# Patient Record
Sex: FEMALE | Race: WHITE | NOT HISPANIC OR LATINO | Employment: UNEMPLOYED | URBAN - NONMETROPOLITAN AREA
[De-identification: names, ages, dates, MRNs, and addresses within clinical notes are randomized per-mention and may not be internally consistent; named-entity substitution may affect disease eponyms.]

---

## 2018-02-14 ENCOUNTER — HOSPITAL ENCOUNTER (EMERGENCY)
Facility: HOSPITAL | Age: 38
Discharge: HOME | End: 2018-02-15
Attending: EMERGENCY MEDICINE | Admitting: EMERGENCY MEDICINE

## 2018-02-14 VITALS
WEIGHT: 200.1 LBS | HEART RATE: 74 BPM | RESPIRATION RATE: 18 BRPM | TEMPERATURE: 99.2 F | OXYGEN SATURATION: 100 % | SYSTOLIC BLOOD PRESSURE: 133 MMHG | BODY MASS INDEX: 35.45 KG/M2 | DIASTOLIC BLOOD PRESSURE: 71 MMHG

## 2018-02-14 DIAGNOSIS — K04.7 DENTAL INFECTION: ICD-10-CM

## 2018-02-14 DIAGNOSIS — H66.90 OTITIS MEDIA: Primary | ICD-10-CM

## 2018-02-15 PROCEDURE — 99282 EMERGENCY DEPT VISIT SF MDM: CPT

## 2018-02-15 RX ORDER — OFLOXACIN 3 MG/ML
10 SOLUTION/ DROPS OPHTHALMIC 4 TIMES DAILY
Qty: 28 ML | Refills: 0 | Status: SHIPPED | OUTPATIENT
Start: 2018-02-15 | End: 2018-03-01

## 2018-02-15 RX ORDER — CLINDAMYCIN HYDROCHLORIDE 150 MG/1
450 CAPSULE ORAL EVERY 8 HOURS SCHEDULED
Qty: 63 CAPSULE | Refills: 0 | Status: SHIPPED | OUTPATIENT
Start: 2018-02-15 | End: 2018-02-22

## 2018-02-15 RX ORDER — LIDOCAINE HYDROCHLORIDE 10 MG/ML
1 INJECTION, SOLUTION EPIDURAL; INFILTRATION; INTRACAUDAL; PERINEURAL ONCE
Status: COMPLETED | OUTPATIENT
Start: 2018-02-15 | End: 2018-02-15

## 2018-02-15 RX ORDER — OFLOXACIN 3 MG/ML
10 SOLUTION/ DROPS OPHTHALMIC ONCE
Status: COMPLETED | OUTPATIENT
Start: 2018-02-15 | End: 2018-02-15

## 2018-02-15 RX ADMIN — OFLOXACIN 10 DROP: 3 SOLUTION/ DROPS OPHTHALMIC at 01:15

## 2018-02-15 RX ADMIN — LIDOCAINE HYDROCHLORIDE 1 ML: 10 INJECTION, SOLUTION EPIDURAL; INFILTRATION; INTRACAUDAL; PERINEURAL at 01:20

## 2018-02-15 NOTE — DISCHARGE INSTRUCTIONS
Dental Abscess   WHAT YOU NEED TO KNOW:   A dental abscess is a collection of pus in or around a tooth  A dental abscess is caused by bacteria  The bacteria usually enter the tooth when the enamel (outer part of the tooth) is damaged by tooth decay  Bacteria may also enter the tooth through a break or chip in the tooth, or a cut in the gum  Food particles that are stuck between the teeth for a long time may also lead to an abscess  DISCHARGE INSTRUCTIONS:   Return to the emergency department if:   · You have severe pain  · You have trouble breathing because of pain or swelling  Contact your healthcare provider if:   · Your symptoms get worse, even after treatment  · Your mouth is bleeding  · You cannot eat or drink because of pain or swelling  · Your abscess returns  · You have an injury that causes a crack in your tooth  · You have questions or concerns about your condition or care  Medicines: You may  need any of the following:  · Antibiotics  help treat a bacterial infection  · NSAIDs , such as ibuprofen, help decrease swelling, pain, and fever  This medicine is available with or without a doctor's order  NSAIDs can cause stomach bleeding or kidney problems in certain people  If you take blood thinner medicine, always ask your healthcare provider if NSAIDs are safe for you  Always read the medicine label and follow directions  · Acetaminophen  decreases pain and fever  It is available without a doctor's order  Ask how much to take and how often to take it  Follow directions  Read the labels of all other medicines you are using to see if they also contain acetaminophen, or ask your doctor or pharmacist  Acetaminophen can cause liver damage if not taken correctly  Do not use more than 4 grams (4,000 milligrams) total of acetaminophen in one day  · Prescription pain medicine  may be given  Ask your healthcare provider how to take this medicine safely   Some prescription pain medicines contain acetaminophen  Do not take other medicines that contain acetaminophen without talking to your healthcare provider  Too much acetaminophen may cause liver damage  Prescription pain medicine may cause constipation  Ask your healthcare provider how to prevent or treat constipation  · Take your medicine as directed  Contact your healthcare provider if you think your medicine is not helping or if you have side effects  Tell him of her if you are allergic to any medicine  Keep a list of the medicines, vitamins, and herbs you take  Include the amounts, and when and why you take them  Bring the list or the pill bottles to follow-up visits  Carry your medicine list with you in case of an emergency  Self-care:   · Rinse your mouth every 2 hours with salt water  This will help keep the area clean  · Gently brush your teeth twice a day with a soft tooth brush  This will help keep the area clean  · Eat soft foods as directed  Soft foods may cause less pain  Examples include applesauce, yogurt, and cooked pasta  Ask your healthcare provider how long to follow this instruction  · Apply a warm compress to your tooth or gum  Use a cotton ball or gauze soaked in warm water  Remove the compress in 10 minutes or when it becomes cool  Repeat 3 times a day  Prevent another abscess:   · Brush your teeth at least 2 times a day with fluoride toothpaste  · Use dental floss to clean between your teeth at least once a day  · Rinse your mouth with water or mouthwash after meals and snacks  · Chew sugarless gum after meals and snacks  · Limit foods that are sticky and high in sugar such as raisons  Also limit drinks high in sugar, such as soda  · See your dentist every 6 months for dental cleanings and oral exams  Follow up with your healthcare provider in 24 hours: Your healthcare provider will need to check your teeth and gums   Write down your questions so you remember to ask them during your visits  © 2017 [employer] Information is for End User's use only and may not be sold, redistributed or otherwise used for commercial purposes  All illustrations and images included in CareNotes® are the copyrighted property of A [employer]  or Raheel Mijares  The above information is an  only  It is not intended as medical advice for individual conditions or treatments  Talk to your doctor, nurse or pharmacist before following any medical regimen to see if it is safe and effective for you  Otitis Media   WHAT YOU NEED TO KNOW:   Otitis media is an ear infection  DISCHARGE INSTRUCTIONS:   Medicines:  · Ibuprofen or acetaminophen  helps decrease your pain and fever  They are available without a doctor's order  Ask your healthcare provider which medicine is right for you  Ask how much to take and how often to take it  These medicines can cause stomach bleeding if not taken correctly  Ibuprofen can cause kidney damage  Do not take ibuprofen if you have kidney disease, an ulcer, or allergies to aspirin  Acetaminophen can cause liver damage  Do not drink alcohol if you take acetaminophen  · Ear drops  help treat your ear pain  · Antibiotics  help treat a bacterial infection that caused your ear infection  · Take your medicine as directed  Contact your healthcare provider if you think your medicine is not helping or if you have side effects  Tell him or her if you are allergic to any medicine  Keep a list of the medicines, vitamins, and herbs you take  Include the amounts, and when and why you take them  Bring the list or the pill bottles to follow-up visits  Carry your medicine list with you in case of an emergency  Heat or ice:   · Heat  may be used to decrease your pain  Place a warm, moist washcloth on your ear  Apply for 15 to 20 minutes, 3 to 4 times a day    · Ice  helps decrease swelling and pain   Use an ice pack or put crushed ice in a plastic bag  Cover the ice pack with a towel and place it on your ear for 15 to 20 minutes, 3 to 4 times a day for 2 days  Prevent otitis media:   · Wash your hands often  Use soap and water  Wash your hands after you use the bathroom, change a child's diapers, or sneeze  Wash your hands before you prepare or eat food  · Stay away from people who are ill  Some germs are easily and quickly spread through contact  Return to work or school: You may return to work or school when your fever is gone  Follow up with your healthcare provider as directed:  Write down your questions so you remember to ask them during your visits  Contact your healthcare provider if:   · Your ear pain gets worse or does not go away, even after treatment  · The outside of your ear is red or swollen  · You have vomiting or diarrhea  · You have fluid coming from your ear  · You have questions or concerns about your condition or care  Return to the emergency department if:   · You have a seizure  · You have a fever and a stiff neck  © 2017 [address] [facility] Information is for End User's use only and may not be sold, redistributed or otherwise used for commercial purposes  All illustrations and images included in CareNotes® are the copyrighted property of [employer]  or Raheel Mijares  The above information is an  only  It is not intended as medical advice for individual conditions or treatments  Talk to your doctor, nurse or pharmacist before following any medical regimen to see if it is safe and effective for you

## 2018-02-17 NOTE — ED PROVIDER NOTES
History  Chief Complaint   Patient presents with    Earache     earache rt side x 2 days     Patient: Addi Escobar  37 y o /female  YOB: 1980  MRN: [id number]  PCP: No primary care provider on file  Date of evaluation: 2/14/2018    (N B  [address] may have been used in the preparation of this document )    Right-sided earache with associated dental pain        History provided by:  Patient  Earache   Location:  Right  Behind ear:  No abnormality  Severity:  Moderate  Onset quality:  Gradual  Duration:  2 days  Timing:  Constant  Progression:  Worsening  Chronicity:  New  Relieved by:  Nothing  Worsened by:  Nothing  Associated symptoms: no abdominal pain, no cough, no diarrhea, no fever, no rash and no vomiting        Prior to Admission Medications   Prescriptions Last Dose Informant Patient Reported? Taking?   ondansetron (ZOFRAN) 4 mg tablet   No No   Sig: Take 1 tablet by mouth every 8 (eight) hours as needed for nausea  Facility-Administered Medications: None       Past Medical History:   Diagnosis Date    Asthma        Past Surgical History:   Procedure Laterality Date    EXPLORATORY LAPAROTOMY      FRACTURE SURGERY      TONSILLECTOMY AND ADENOIDECTOMY         History reviewed  No pertinent family history  I have reviewed and agree with the history as documented  Social History   Substance Use Topics    Smoking status: Current Every Day Smoker     Packs/day: 1 00    Smokeless tobacco: Not on file    Alcohol use No        Review of Systems   Constitutional: Negative for chills and fever  HENT: Positive for ear pain (Right-sided for 2 days)  Respiratory: Negative for cough and shortness of breath  Cardiovascular: Negative for chest pain and palpitations  Gastrointestinal: Negative for abdominal pain, diarrhea and vomiting  Genitourinary: Negative for decreased urine volume and difficulty urinating  Skin: Negative for color change and rash  Physical Exam  ED Triage Vitals [02/14/18 2340]   Temperature Pulse Respirations Blood Pressure SpO2   99 2 °F (37 3 °C) 74 18 133/71 100 %      Temp Source Heart Rate Source Patient Position - Orthostatic VS BP Location FiO2 (%)   Temporal Monitor Sitting Left arm --      Pain Score       Worst Possible Pain           Orthostatic Vital Signs  Vitals:    02/14/18 2340   BP: 133/71   Pulse: 74   Patient Position - Orthostatic VS: Sitting       Physical Exam   Constitutional: She appears well-developed and well-nourished  HENT:   Right Ear: External ear and ear canal normal  Tympanic membrane is erythematous and bulging  Left Ear: Tympanic membrane, external ear and ear canal normal    Cardiovascular: Normal rate and regular rhythm  Pulmonary/Chest: Effort normal and breath sounds normal    Abdominal: Soft  There is no tenderness  Neurological: She is alert  GCS eye subscore is 4  GCS verbal subscore is 5  GCS motor subscore is 6  Psychiatric: She has a normal mood and affect  Her speech is normal and behavior is normal    Nursing note and vitals reviewed        ED Medications  Medications   lidocaine (PF) (XYLOCAINE-MPF) 1 % injection 1 mL (1 mL Intra-articular Given 2/15/18 0120)   ofloxacin (OCUFLOX) 0 3 % ophthalmic solution 10 drop (10 drops Otic Given 2/15/18 0115)       Diagnostic Studies  Results Reviewed     None                 No orders to display              Procedures  Procedures       Phone Contacts  ED Phone Contact    ED Course  ED Course                                MDM  CritCare Time    Disposition  Final diagnoses:   Otitis media   Dental infection     Time reflects when diagnosis was documented in both MDM as applicable and the Disposition within this note     Time User Action Codes Description Comment    2/15/2018  1:10 AM Sobeida Cruz Add [H66 90] Otitis media     2/15/2018  1:10 AM Jaime CONTRERAS Add [K04 7] Dental infection       ED Disposition     ED Disposition Condition Comment    Discharge  Nadeen Sanchez discharge to home/self care  Condition at discharge: Good        Follow-up Information     Follow up With Specialties Details [address]  Call Ask for a primary care provider (family doctor)  , (Also given paper with list of local doctors  ) [address]  Call in 1 day  [address] Brii Boyd MD Otolaryngology, Plastic Surgery Call in 1 day  [address] [facility] [address]  [phone]          Discharge Medication List as of 2/15/2018  1:14 AM      CONTINUE these medications which have NOT CHANGED    Details   ondansetron (ZOFRAN) 4 mg tablet Take 1 tablet by mouth every 8 (eight) hours as needed for nausea , Starting 9/11/2016, Until Discontinued, Print           No discharge procedures on file      ED Provider  Electronically Signed by           Katie Cooper MD  02/17/18 4806

## 2019-02-28 ENCOUNTER — HOSPITAL ENCOUNTER (EMERGENCY)
Facility: HOSPITAL | Age: 39
Discharge: HOME | End: 2019-02-28
Attending: EMERGENCY MEDICINE | Admitting: EMERGENCY MEDICINE

## 2019-02-28 VITALS
WEIGHT: 202.5 LBS | HEART RATE: 100 BPM | RESPIRATION RATE: 18 BRPM | BODY MASS INDEX: 35.87 KG/M2 | TEMPERATURE: 98.2 F | DIASTOLIC BLOOD PRESSURE: 76 MMHG | SYSTOLIC BLOOD PRESSURE: 130 MMHG | OXYGEN SATURATION: 99 %

## 2019-02-28 DIAGNOSIS — L98.9 LESION OF SKIN OF SCALP: ICD-10-CM

## 2019-02-28 DIAGNOSIS — L30.9 DERMATITIS: Primary | ICD-10-CM

## 2019-02-28 DIAGNOSIS — L29.9 SCALP ITCH: ICD-10-CM

## 2019-02-28 LAB — EXT PREG TEST URINE: NORMAL

## 2019-02-28 PROCEDURE — 81025 URINE PREGNANCY TEST: CPT | Performed by: EMERGENCY MEDICINE

## 2019-02-28 PROCEDURE — 99283 EMERGENCY DEPT VISIT LOW MDM: CPT

## 2019-02-28 RX ORDER — SULFAMETHOXAZOLE AND TRIMETHOPRIM 800; 160 MG/1; MG/1
1 TABLET ORAL 2 TIMES DAILY
Qty: 10 TABLET | Refills: 0 | Status: SHIPPED | OUTPATIENT
Start: 2019-02-28 | End: 2019-03-05

## 2019-02-28 RX ORDER — PREDNISONE 20 MG/1
60 TABLET ORAL ONCE
Status: COMPLETED | OUTPATIENT
Start: 2019-02-28 | End: 2019-02-28

## 2019-02-28 RX ORDER — CETIRIZINE HYDROCHLORIDE 10 MG/1
10 TABLET ORAL DAILY
Qty: 7 TABLET | Refills: 0 | Status: SHIPPED | OUTPATIENT
Start: 2019-02-28 | End: 2019-07-09

## 2019-02-28 RX ORDER — SULFAMETHOXAZOLE AND TRIMETHOPRIM 800; 160 MG/1; MG/1
1 TABLET ORAL ONCE
Status: COMPLETED | OUTPATIENT
Start: 2019-02-28 | End: 2019-02-28

## 2019-02-28 RX ORDER — LORATADINE 10 MG/1
10 TABLET ORAL ONCE
Status: COMPLETED | OUTPATIENT
Start: 2019-02-28 | End: 2019-02-28

## 2019-02-28 RX ADMIN — LORATADINE 10 MG: 10 TABLET ORAL at 03:44

## 2019-02-28 RX ADMIN — PREDNISONE 60 MG: 20 TABLET ORAL at 03:45

## 2019-02-28 RX ADMIN — SULFAMETHOXAZOLE AND TRIMETHOPRIM 1 TABLET: 800; 160 TABLET ORAL at 03:44

## 2019-02-28 NOTE — DISCHARGE INSTRUCTIONS
[address] [place], [place]  [address]    [address]   [address]   [address] 2000, [address] [facility]  [phone]    [facility]  [address] [place], [address] [facility] - [address] [place] [address], [address] [place] [address]  [phone]      [facility] [place]  [address]  [place], [address] [facility]  [phone]

## 2019-03-05 NOTE — ED PROVIDER NOTES
History  Chief Complaint   Patient presents with    Skin Irritation     Patient stated she has psoriasis and was scratching scalp and has the skin irritated and sore  Patient: Angel Given  38 y o /female  YOB: 1980  MRN: [id number]  PCP: No primary care provider on file  Date of evaluation: 2/28/2019    (NAYA Vale may have been used in the preparation of this document  Occasional wrong word or "sound-alike" substitutions may have occurred due to the inherent limitations of voice recognition software  Interpretation should be guided by context )    Pt c/o bumps and pain on scalp  She says that everything started with just scalp itching  As she scratched, she started to have pain, and then she noted scattered pimples  History limited by: Pt extremely inattentive and vague  Rash   Quality: itchiness and painful    Pain details:     Severity:  Unable to specify    Onset quality:  Unable to specify    Timing:  Unable to specify    Progression:  Worsening  Severity:  Unable to specify  Onset quality:  Unable to specify  Timing:  Constant  Progression:  Worsening  Chronicity:  New  Context: not insect bite/sting, not medications and not sick contacts  New detergent/soap: unclear  Relieved by:  Nothing  Worsened by:  Nothing  Ineffective treatments:  None tried  Associated symptoms: no abdominal pain, no diarrhea, no fever, no nausea, no shortness of breath, no sore throat, no throat swelling, no tongue swelling, no URI and not vomiting        None       Past Medical History:   Diagnosis Date    Asthma        Past Surgical History:   Procedure Laterality Date    EXPLORATORY LAPAROTOMY      FRACTURE SURGERY      TONSILLECTOMY AND ADENOIDECTOMY         History reviewed  No pertinent family history  I have reviewed and agree with the history as documented      Social History     Tobacco Use    Smoking status: Current Every Day Smoker     Packs/day: 1 00 Types: Cigarettes    Smokeless tobacco: Never Used   Substance Use Topics    Alcohol use: No    Drug use: Yes     Types: Marijuana     Comment: marjuana/meth use a "few months ago" and "clean of heroin 9 months"        Review of Systems   Constitutional: Negative for chills and fever  HENT: Negative for hearing loss, sore throat, trouble swallowing and voice change  Eyes: Negative for pain, redness and visual disturbance  Respiratory: Negative for cough and shortness of breath  Cardiovascular: Negative for chest pain and palpitations  Gastrointestinal: Negative for abdominal pain, constipation, diarrhea, nausea and vomiting  Genitourinary: Negative for dysuria, hematuria, vaginal bleeding and vaginal discharge  Musculoskeletal: Negative for back pain, gait problem and neck pain  Skin: Positive for rash (scalp)  Negative for color change  Neurological: Negative for weakness and light-headedness  Psychiatric/Behavioral: Negative for confusion and decreased concentration  The patient is not nervous/anxious  All other systems reviewed and are negative  Physical Exam  Physical Exam   Constitutional: She is oriented to person, place, and time  She appears well-developed and well-nourished  HENT:   Mouth/Throat: Oropharynx is clear and moist and mucous membranes are normal    Voice normal   Eyes: Pupils are equal, round, and reactive to light  EOM are normal    Cardiovascular: Normal rate and regular rhythm  Pulmonary/Chest: Effort normal    Abdominal: Soft  Bowel sounds are normal    Neurological: She is alert and oriented to person, place, and time  GCS eye subscore is 4  GCS verbal subscore is 5  GCS motor subscore is 6  Skin: Skin is warm and dry  Scattered whiteheads   Psychiatric: She has a normal mood and affect  Her speech is normal and behavior is normal    Nursing note and vitals reviewed        Vital Signs  ED Triage Vitals [02/28/19 0228]   Temperature Pulse Respirations Blood Pressure SpO2   98 6 °F (37 °C) 104 16 138/77 98 %      Temp Source Heart Rate Source Patient Position - Orthostatic VS BP Location FiO2 (%)   Temporal Monitor Sitting Right arm --      Pain Score       7           Vitals:    02/28/19 0228 02/28/19 0346   BP: 138/77 130/76   Pulse: 104 100   Patient Position - Orthostatic VS: Sitting        Visual Acuity      ED Medications  Medications   sulfamethoxazole-trimethoprim (BACTRIM DS) 800-160 mg per tablet 1 tablet (1 tablet Oral Given 2/28/19 0344)   predniSONE tablet 60 mg (60 mg Oral Given 2/28/19 0345)   loratadine (CLARITIN) tablet 10 mg (10 mg Oral Given 2/28/19 0344)       Diagnostic Studies  Results Reviewed     Procedure Component Value Units Date/Time    POCT pregnancy, urine [[id number]]  (Normal) Resulted:  02/28/19 0304    Lab Status:  Final result Updated:  02/28/19 0304     EXT PREG TEST UR (Ref: Negative) Negative Result                 No orders to display              Procedures  Procedures       Phone Contacts  ED Phone Contact    ED Course                               MDM    Disposition  Final diagnoses:   Dermatitis   Scalp itch   Lesion of skin of scalp     Time reflects when diagnosis was documented in both MDM as applicable and the Disposition within this note     Time User Action Codes Description Comment    2/28/2019  2:43 AM Kevin CONTRERAS Add [L30 9] Dermatitis     2/28/2019  2:43 AM Xochitl Cruz Add [L29 9] Scalp itch     2/28/2019  2:44 AM Kevin CONTRERAS Add [L98 9] Lesion of skin of scalp       ED Disposition     ED Disposition Condition Date/Time Comment    Discharge Stable u Feb 28, 2019  2:43 AM Bear Junk discharge to home/self care  Follow-up Information     Follow up With Specialties Details Why Contact Info    [employer]  Call  For followup, Ask for a primary care provider (family doctor)  , (Also given list of local PCPs) [phone]            Discharge Medication List as of 2/28/2019  3:26 AM CONTINUE these medications which have NOT CHANGED    Details   ondansetron (ZOFRAN) 4 mg tablet Take 1 tablet by mouth every 8 (eight) hours as needed for nausea , Starting 9/11/2016, Until Discontinued, Print           No discharge procedures on file      ED Provider  Electronically Signed by           Kiana Pederson MD  03/05/19 7978

## 2019-07-09 ENCOUNTER — HOSPITAL ENCOUNTER (EMERGENCY)
Facility: HOSPITAL | Age: 39
Discharge: HOME | End: 2019-07-09

## 2019-07-09 VITALS
SYSTOLIC BLOOD PRESSURE: 121 MMHG | WEIGHT: 150 LBS | DIASTOLIC BLOOD PRESSURE: 71 MMHG | OXYGEN SATURATION: 98 % | TEMPERATURE: 97.7 F | BODY MASS INDEX: 26.58 KG/M2 | HEIGHT: 63 IN | HEART RATE: 80 BPM | RESPIRATION RATE: 16 BRPM

## 2019-07-09 DIAGNOSIS — S61.219A FINGER LACERATION: ICD-10-CM

## 2019-07-09 DIAGNOSIS — S61.452A DOG BITE, HAND, LEFT, INITIAL ENCOUNTER: Primary | ICD-10-CM

## 2019-07-09 DIAGNOSIS — S60.311A ABRASION OF RIGHT THUMB, INITIAL ENCOUNTER: ICD-10-CM
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
## 2019-07-09 PROCEDURE — 99283 EMERGENCY DEPT VISIT LOW MDM: CPT

## 2019-07-09 RX ORDER — IBUPROFEN 800 MG/1
800 TABLET ORAL 3 TIMES DAILY
Qty: 12 TABLET | Refills: 0 | Status: SHIPPED | OUTPATIENT
Start: 2019-07-09 | End: 2019-07-13

## 2019-07-09 RX ORDER — LIDOCAINE HYDROCHLORIDE 20 MG/ML
10 INJECTION, SOLUTION EPIDURAL; INFILTRATION; INTRACAUDAL; PERINEURAL ONCE
Status: COMPLETED | OUTPATIENT
Start: 2019-07-09 | End: 2019-07-09

## 2019-07-09 RX ORDER — DOXYCYCLINE HYCLATE 100 MG/1
100 CAPSULE ORAL 2 TIMES DAILY
Qty: 14 CAPSULE | Refills: 0 | Status: SHIPPED | OUTPATIENT
Start: 2019-07-09 | End: 2019-07-16

## 2019-07-09 RX ORDER — GINSENG 100 MG
2 CAPSULE ORAL ONCE
Status: COMPLETED | OUTPATIENT
Start: 2019-07-09 | End: 2019-07-09

## 2019-07-09 RX ADMIN — BACITRACIN ZINC 2 SMALL APPLICATION: 500 OINTMENT TOPICAL at 14:35

## 2019-07-09 RX ADMIN — LIDOCAINE HYDROCHLORIDE 10 ML: 20 INJECTION, SOLUTION EPIDURAL; INFILTRATION; INTRACAUDAL; PERINEURAL at 14:34

## 2019-07-09 NOTE — ED PROVIDER NOTES
History  Chief Complaint   Patient presents with    Dog Bite      her dogs who were fighting and was bit  Both dogs belong to the patient and are up to date on shots  Leyda Puentes is a 43-year-old female who came to the emergency department with a laceration on the left 5th finger obtained from a dog bite today  Patient owns the dog with has updated immunization status  Patient also has updated tetanus immunization status  Bleeding is controlled on arrival in the emergency department  History provided by:  Patient and significant other   used: No    Dog Bite   Contact animal:  Dog  Location:  Finger  Finger injury location:  L little finger  Time since incident: Few  Pain details:     Quality:  Aching    Severity:  Moderate    Timing:  Constant    Progression:  Unchanged  Incident location:  Home  Provoked: unprovoked    Notifications:  None  Animal's rabies vaccination status:  Up to date  Animal in possession: yes    Tetanus status:  Up to date  Relieved by:  Nothing  Worsened by:  Nothing  Ineffective treatments:  None tried  Associated symptoms: no fever, no numbness, no rash and no swelling        None       Past Medical History:   Diagnosis Date    Asthma        Past Surgical History:   Procedure Laterality Date    EXPLORATORY LAPAROTOMY      FRACTURE SURGERY      TONSILLECTOMY AND ADENOIDECTOMY         History reviewed  No pertinent family history  I have reviewed and agree with the history as documented  Social History     Tobacco Use    Smoking status: Current Every Day Smoker     Packs/day: 1 00     Types: Cigarettes    Smokeless tobacco: Never Used   Substance Use Topics    Alcohol use: No    Drug use: Yes     Types: Marijuana     Comment: marjuana/meth use a "few months ago" and "clean of heroin 9 months"        Review of Systems   Constitutional: Negative for fever  HENT: Negative  Eyes: Negative  Respiratory: Negative  Cardiovascular: Negative  Gastrointestinal: Negative  Endocrine: Negative  Genitourinary: Negative  Musculoskeletal: Negative  Skin: Positive for wound  Negative for rash  Allergic/Immunologic: Negative  Neurological: Negative for numbness  Hematological: Negative  Psychiatric/Behavioral: Negative  Physical Exam  Physical Exam   Constitutional: She is oriented to person, place, and time  She appears well-developed and well-nourished  No distress  HENT:   Head: Normocephalic and atraumatic  Right Ear: External ear normal    Left Ear: External ear normal    Nose: Nose normal    Mouth/Throat: Oropharynx is clear and moist  No oropharyngeal exudate  Eyes: Pupils are equal, round, and reactive to light  Conjunctivae and EOM are normal  Right eye exhibits no discharge  Left eye exhibits no discharge  No scleral icterus  Neck: Normal range of motion  Neck supple  No tracheal deviation present  No thyromegaly present  Cardiovascular: Normal rate, regular rhythm, normal heart sounds and intact distal pulses  Pulmonary/Chest: Effort normal and breath sounds normal  No stridor  No respiratory distress  She has no wheezes  Abdominal: Soft  Bowel sounds are normal  She exhibits no distension  There is no tenderness  Musculoskeletal: Normal range of motion  She exhibits no edema, tenderness or deformity  Lymphadenopathy:     She has no cervical adenopathy  Neurological: She is alert and oriented to person, place, and time  No cranial nerve deficit or sensory deficit  She exhibits normal muscle tone  Coordination normal    Skin: Skin is warm and dry  No rash noted  She is not diaphoretic  No erythema  No pallor  6 cm laceration on the medial aspect of the left 5th finger with intact neurovascular status  2 cm laceration on the lateral aspect of the left 5th finger  Bleeding is controlled  Abrasion on the right thumb  Psychiatric: She has a normal mood and affect   Her behavior is normal  Judgment and thought content normal    Nursing note and vitals reviewed  Vital Signs  ED Triage Vitals [07/09/19 1329]   Temperature Pulse Respirations Blood Pressure SpO2   97 7 °F (36 5 °C) 80 16 121/71 98 %      Temp Source Heart Rate Source Patient Position - Orthostatic VS BP Location FiO2 (%)   Temporal Monitor Sitting Right arm --      Pain Score       5           Vitals:    07/09/19 1329   BP: 121/71   Pulse: 80   Patient Position - Orthostatic VS: Sitting         Visual Acuity      ED Medications  Medications   lidocaine (PF) (XYLOCAINE-MPF) 2 % injection 10 mL (has no administration in time range)   bacitracin topical ointment 2 small application (has no administration in time range)       Diagnostic Studies  Results Reviewed     None                 No orders to display              Procedures  Laceration repair  Date/Time: 7/9/2019 2:21 PM  Performed by: Brandon Chu MD  Authorized by: Brandon Chu MD   Consent: The procedure was performed in an emergent situation  Verbal consent obtained  Risks and benefits: risks, benefits and alternatives were discussed  Consent given by: patient  Patient understanding: patient states understanding of the procedure being performed  Site marked: the operative site was marked  Patient identity confirmed: verbally with patient, arm band, provided demographic data and hospital-assigned identification number  Time out: Immediately prior to procedure a "time out" was called to verify the correct patient, procedure, equipment, support staff and site/side marked as required    Body area: upper extremity  Location details: left ring finger  Laceration length: 8 cm  Foreign bodies: no foreign bodies  Tendon involvement: none  Nerve involvement: none  Vascular damage: no  Anesthesia: digital block    Anesthesia:  Local Anesthetic: lidocaine 2% without epinephrine  Anesthetic total: 10 mL    Sedation:  Patient sedated: no      Wound Dehiscence:  Superficial Wound Dehiscence: simple closure      Procedure Details:  Preparation: Patient was prepped and draped in the usual sterile fashion  Irrigation solution: saline  Irrigation method: jet lavage  Amount of cleaning: extensive  Debridement: none  Degree of undermining: none  Skin closure: 4-0 nylon  Number of sutures: 10  Technique: simple  Approximation: close  Approximation difficulty: simple  Dressing: 4x4 sterile gauze and antibiotic ointment  Patient tolerance: Patient tolerated the procedure well with no immediate complications             ED Course                               MDM  Number of Diagnoses or Management Options  Abrasion of right thumb, initial encounter: minor  Dog bite, hand, left, initial encounter: minor  Finger laceration: minor  Risk of Complications, Morbidity, and/or Mortality  Presenting problems: minimal  Management options: minimal    Patient Progress  Patient progress: improved      Disposition  Final diagnoses:   Dog bite, hand, left, initial encounter   Finger laceration   Abrasion of right thumb, initial encounter     Time reflects when diagnosis was documented in both MDM as applicable and the Disposition within this note     Time User Action Codes Description Comment    7/9/2019  2:22 PM Thierry Hernandez Promise Nephew  0XXA] Dog bite, initial encounter     7/9/2019  2:22 PM Anne-Marie Pereira [G71  0XXA] Dog bite, initial encounter     7/9/2019  2:22 PM Thierry Hernandez [employer]  0XXA] Dog bite, hand, left, initial encounter     7/9/2019  2:23 PM Thierry Hernandez [R52 [id number]] Finger laceration     7/9/2019  2:23 PM Stephani Pereira [S60 311A] Abrasion of right thumb, initial encounter       ED Disposition     ED Disposition Condition Date/Time Comment    Discharge Stable Tue Jul 9, 2019  2:22 PM Sanju Khan discharge to home/self care              Follow-up Information     Follow up With Specialties Details [address] [facility]  Emergency Department Emergency Medicine In 14 days For suture removal [address] [facility] [id number] [phone]          Patient's Medications   Discharge Prescriptions    DOXYCYCLINE HYCLATE (VIBRAMYCIN) 100 MG CAPSULE    Take 1 capsule (100 mg total) by mouth 2 (two) times a day for 7 days       Start Date: 7/9/2019  End Date: 7/16/2019       Order Dose: 100 mg       Quantity: 14 capsule    Refills: 0    IBUPROFEN (MOTRIN) 800 MG TABLET    Take 1 tablet (800 mg total) by mouth 3 (three) times a day for 12 doses As needed for pain       Start Date: 7/9/2019  End Date: 7/13/2019       Order Dose: 800 mg       Quantity: 12 tablet    Refills: 0     No discharge procedures on file      ED Provider  Electronically Signed by           Sebastian Victor MD  07/09/19 1127

## 2020-08-21 ENCOUNTER — HOSPITAL ENCOUNTER (EMERGENCY)
Facility: HOSPITAL | Age: 40
Discharge: HOME | End: 2020-08-21
Attending: EMERGENCY MEDICINE | Admitting: EMERGENCY MEDICINE

## 2020-08-21 VITALS
TEMPERATURE: 98.1 F | BODY MASS INDEX: 26.56 KG/M2 | SYSTOLIC BLOOD PRESSURE: 143 MMHG | WEIGHT: 149.91 LBS | DIASTOLIC BLOOD PRESSURE: 68 MMHG | RESPIRATION RATE: 18 BRPM | HEART RATE: 83 BPM | OXYGEN SATURATION: 98 %

## 2020-08-21 DIAGNOSIS — L03.115 CELLULITIS OF RIGHT LOWER EXTREMITY: Primary | ICD-10-CM

## 2020-08-21 PROCEDURE — 99281 EMR DPT VST MAYX REQ PHY/QHP: CPT

## 2020-08-21 PROCEDURE — 76882 US LMTD JT/FCL EVL NVASC XTR: CPT | Performed by: EMERGENCY MEDICINE

## 2020-08-21 PROCEDURE — 99284 EMERGENCY DEPT VISIT MOD MDM: CPT | Performed by: EMERGENCY MEDICINE

## 2020-08-21 RX ORDER — SULFAMETHOXAZOLE AND TRIMETHOPRIM 800; 160 MG/1; MG/1
1 TABLET ORAL ONCE
Status: DISCONTINUED | OUTPATIENT
Start: 2020-08-21 | End: 2020-08-21

## 2020-08-21 RX ORDER — DOXYCYCLINE HYCLATE 100 MG/1
100 CAPSULE ORAL ONCE
Status: COMPLETED | OUTPATIENT
Start: 2020-08-21 | End: 2020-08-21

## 2020-08-21 RX ORDER — SULFAMETHOXAZOLE AND TRIMETHOPRIM 800; 160 MG/1; MG/1
1 TABLET ORAL 2 TIMES DAILY
Qty: 14 TABLET | Refills: 0 | Status: SHIPPED | OUTPATIENT
Start: 2020-08-21 | End: 2020-08-21 | Stop reason: CLARIF

## 2020-08-21 RX ORDER — DOXYCYCLINE HYCLATE 100 MG/1
100 CAPSULE ORAL 2 TIMES DAILY
Qty: 14 CAPSULE | Refills: 0 | Status: SHIPPED | OUTPATIENT
Start: 2020-08-21 | End: 2020-08-28

## 2020-08-21 RX ADMIN — DOXYCYCLINE 100 MG: 100 CAPSULE ORAL at 23:13

## 2020-08-22 NOTE — DISCHARGE INSTRUCTIONS
The swelling and redness of your posterior right leg is from cellulitis an infection of the skin and subcutaneous tissues  The ultrasound did not show any evidence of abscess, a pocket of pus that would require drainage  I will start you on antibiotics to treat the infection  Take the antibiotic as prescribed for 1 week  Return to the ER if the redness does not improve within 2 days of being on antibiotics  Return if the redness is rapidly increasing in size  Return with any fever, chills, significant leg swelling, numbness/tingling of your foot, weakness of your foot, or any other concerning symptoms  Please call the clinic on Monday to find a primary care physician

## 2020-08-22 NOTE — ED PROVIDER NOTES
History  Chief Complaint   Patient presents with    Insect Bite     2 day hx of insect bite behind rt knee, area open from itching     18-year-old female with no pertinent past medical history who is presenting with pain and swelling on the posterior right leg  Patient states that 2 days ago, she was bitten by an insect just superior to the right popliteal fossa  The bite was itchy and she began to scratch the area  The itching resolved today but the area has become swollen and painful  Patient also noted redness and warmth to touch  She was concerned for infection  Patient denies any fever or shaking chills  No diffuse leg swelling  No weakness, numbness, or tingling of the right leg  No other complaints on review of systems  Prior to Admission Medications   Prescriptions Last Dose Informant Patient Reported? Taking?   ibuprofen (MOTRIN) 800 mg tablet   No No   Sig: Take 1 tablet (800 mg total) by mouth 3 (three) times a day for 12 doses As needed for pain      Facility-Administered Medications: None       Past Medical History:   Diagnosis Date    Asthma        Past Surgical History:   Procedure Laterality Date    EXPLORATORY LAPAROTOMY      FRACTURE SURGERY      TONSILLECTOMY AND ADENOIDECTOMY         History reviewed  No pertinent family history  I have reviewed and agree with the history as documented  E-Cigarette/Vaping     E-Cigarette/Vaping Substances     Social History     Tobacco Use    Smoking status: Current Every Day Smoker     Packs/day: 1 00     Types: Cigarettes    Smokeless tobacco: Never Used   Substance Use Topics    Alcohol use: No    Drug use: Yes     Types: Marijuana     Comment: marjuana/meth use a "few months ago" and "clean of heroin 9 months"       Review of Systems   Constitutional: Negative for diaphoresis, fever and unexpected weight change  HENT: Negative for congestion, rhinorrhea and sore throat      Eyes: Negative for pain, discharge and visual disturbance  Respiratory: Negative for cough, shortness of breath and wheezing  Cardiovascular: Negative for chest pain, palpitations and leg swelling  Gastrointestinal: Negative for abdominal pain, blood in stool, constipation, diarrhea, nausea and vomiting  Genitourinary: Negative for dysuria, flank pain and hematuria  Musculoskeletal: Negative for arthralgias and joint swelling  Skin: Positive for color change and wound (possible insect bite)  Negative for rash  Allergic/Immunologic: Negative for environmental allergies and food allergies  Neurological: Negative for dizziness, seizures, weakness and numbness  Hematological: Negative for adenopathy  Psychiatric/Behavioral: Negative for confusion and hallucinations  Physical Exam  Physical Exam  Vitals signs and nursing note reviewed  Constitutional:       General: She is not in acute distress  Appearance: She is well-developed  HENT:      Head: Normocephalic and atraumatic  Right Ear: External ear normal       Left Ear: External ear normal    Eyes:      Conjunctiva/sclera: Conjunctivae normal       Pupils: Pupils are equal, round, and reactive to light  Musculoskeletal: Normal range of motion  General: No deformity  Skin:     General: Skin is warm and dry  Findings: Erythema present  Comments: There is an area of induration just superior to the right popliteal fossa  No palpable fluctuance  Area of open skin in the center of the area of induration  Ill-defined blanching erythema surrounding this area of induration, suggestive of cellulitis  No crepitus or bullae  No skin necrosis  Neurological:      Mental Status: She is alert and oriented to person, place, and time  Comments: No gross motor deficits noted  Cranial nerves II-XII are intact  Speech is fluent without dysarthria or aphasia     Psychiatric:         Mood and Affect: Mood normal          Behavior: Behavior normal          Vital Signs  ED Triage Vitals [08/21/20 2253]   Temperature Pulse Respirations Blood Pressure SpO2   98 1 °F (36 7 °C) 84 18 143/68 99 %      Temp src Heart Rate Source Patient Position - Orthostatic VS BP Location FiO2 (%)   -- Monitor Lying Left arm --      Pain Score       --           Vitals:    08/21/20 2253 08/21/20 2300   BP: 143/68    Pulse: 84 83   Patient Position - Orthostatic VS: Lying          Visual Acuity      ED Medications  Medications   doxycycline hyclate (VIBRAMYCIN) capsule 100 mg (100 mg Oral Given 8/21/20 2313)       Diagnostic Studies  Results Reviewed     None                 No orders to display              Procedures  Procedures                 ED Course  ED Course as of Aug 21 2314   Fri Aug 21, 2020   2309 ER bedside ultrasound was performed  Subcutaneous cobblestoning was seen suggestive of cellulitis  No discrete fluid collection to suggest abscess  MDM  Number of Diagnoses or Management Options  Cellulitis of right lower extremity: new and does not require workup  Diagnosis management comments:     Clinical presentation suggestive of cellulitis  Clinically, did not feel the patient had an abscess  Ultrasound was used and demonstrated subcutaneous cobblestoning but no discrete fluid collection to suggest abscess  Patient was started on doxycycline which would cover streptococcal infection as well as MRSA  She was given strict return precautions for cellulitis  Patient was also given information for [facility]  Patient verbalized understanding of the diagnosis, plan for treatment, need for follow-up, and return precautions         Amount and/or Complexity of Data Reviewed  Tests in the radiology section of CPT®: reviewed  Decide to obtain previous medical records or to obtain history from someone other than the patient: yes  Review and summarize past medical records: yes  Independent visualization of images, tracings, or specimens: yes    Risk of Complications, Morbidity, and/or Mortality  Presenting problems: low  Diagnostic procedures: minimal  Management options: minimal    Patient Progress  Patient progress: stable        Disposition  Final diagnoses:   Cellulitis of right lower extremity     Time reflects when diagnosis was documented in both MDM as applicable and the Disposition within this note     Time User Action Codes Description Comment    8/21/2020 11:05 PM Adenike [place] Add [[id number]] Cellulitis of right lower extremity       ED Disposition     ED Disposition Condition Date/Time Comment    Discharge Good Fri Aug 21, 2020 11:05 PM Jake Soto discharge to home/self care  Follow-up Information     Follow up With Specialties Details [place] [address] Emergency Department Emergency Medicine Go to  If symptoms worsen  [place] [id number]  [phone] [place] ED, [address], [address] Family Medicine Call in 3 days Please call to establish with a PCP  [address] [facility] [id number]  [address] [place] [address], [id number] [phone]          Patient's Medications   Discharge Prescriptions    DOXYCYCLINE HYCLATE (VIBRAMYCIN) 100 MG CAPSULE    Take 1 capsule (100 mg total) by mouth 2 (two) times a day for 7 days       Start Date: 8/21/2020 End Date: 8/28/2020       Order Dose: 100 mg       Quantity: 14 capsule    Refills: 0     No discharge procedures on file      PDMP Review     None          ED Provider  Electronically Signed by           Kimberlyn Bender MD  08/21/20 8303

## 2020-09-17 ENCOUNTER — PATIENT OUTREACH (OUTPATIENT)
Dept: CASE MANAGEMENT | Facility: OTHER | Age: 40
End: 2020-09-17

## 2020-09-17 NOTE — PROGRESS NOTES
Outpatient Care Management Note:  Patient was identified via report as having a recent BREE Level 4 / 5 ED visit at [address] [place] [address]  Chart reviewed  Patient was in the ED on 08/21/20 for cellulits of right lower extremity  It appears patient may have no medical insurance and no PCP  Telephone outreach attempt #1 made to introduce self and role of care management  No answer  Unable to leave voicemail message

## 2020-10-07 ENCOUNTER — PATIENT OUTREACH (OUTPATIENT)
Dept: CASE MANAGEMENT | Facility: OTHER | Age: 40
End: 2020-10-07

## 2020-10-14 ENCOUNTER — PATIENT OUTREACH (OUTPATIENT)
Dept: CASE MANAGEMENT | Facility: OTHER | Age: 40
End: 2020-10-14

## 2021-08-13 ENCOUNTER — APPOINTMENT (EMERGENCY)
Dept: RADIOLOGY | Facility: HOSPITAL | Age: 41
End: 2021-08-13
Payer: COMMERCIAL

## 2021-08-13 ENCOUNTER — HOSPITAL ENCOUNTER (EMERGENCY)
Facility: HOSPITAL | Age: 41
Discharge: HOME | End: 2021-08-14
Attending: EMERGENCY MEDICINE | Admitting: EMERGENCY MEDICINE
Payer: COMMERCIAL

## 2021-08-13 DIAGNOSIS — K04.7 DENTAL ABSCESS: ICD-10-CM

## 2021-08-13 DIAGNOSIS — R20.2 ARM PARESTHESIA, LEFT: Primary | ICD-10-CM

## 2021-08-13 LAB
ANION GAP SERPL CALCULATED.3IONS-SCNC: 3 MMOL/L (ref 4–13)
BASOPHILS # BLD AUTO: 0.07 THOUSANDS/ΜL (ref 0–0.1)
BASOPHILS NFR BLD AUTO: 1 % (ref 0–1)
BUN SERPL-MCNC: 11 MG/DL (ref 5–25)
CALCIUM SERPL-MCNC: 8.9 MG/DL (ref 8.3–10.1)
CHLORIDE SERPL-SCNC: 104 MMOL/L (ref 100–108)
CO2 SERPL-SCNC: 30 MMOL/L (ref 21–32)
CREAT SERPL-MCNC: 0.71 MG/DL (ref 0.6–1.3)
D DIMER PPP FEU-MCNC: 0.97 UG/ML FEU
EOSINOPHIL # BLD AUTO: 0.37 THOUSAND/ΜL (ref 0–0.61)
EOSINOPHIL NFR BLD AUTO: 5 % (ref 0–6)
ERYTHROCYTE [DISTWIDTH] IN BLOOD BY AUTOMATED COUNT: 13.4 % (ref 11.6–15.1)
EXT PREG TEST URINE: NEGATIVE
EXT. CONTROL ED NAV: NORMAL
GFR SERPL CREATININE-BSD FRML MDRD: 105 ML/MIN/1.73SQ M
GLUCOSE SERPL-MCNC: 92 MG/DL (ref 65–140)
HCT VFR BLD AUTO: 40.4 % (ref 34.8–46.1)
HGB BLD-MCNC: 12.7 G/DL (ref 11.5–15.4)
IMM GRANULOCYTES # BLD AUTO: 0.03 THOUSAND/UL (ref 0–0.2)
IMM GRANULOCYTES NFR BLD AUTO: 0 % (ref 0–2)
LYMPHOCYTES # BLD AUTO: 3.22 THOUSANDS/ΜL (ref 0.6–4.47)
LYMPHOCYTES NFR BLD AUTO: 44 % (ref 14–44)
MCH RBC QN AUTO: 28.8 PG (ref 26.8–34.3)
MCHC RBC AUTO-ENTMCNC: 31.4 G/DL (ref 31.4–37.4)
MCV RBC AUTO: 92 FL (ref 82–98)
MONOCYTES # BLD AUTO: 0.69 THOUSAND/ΜL (ref 0.17–1.22)
MONOCYTES NFR BLD AUTO: 9 % (ref 4–12)
NEUTROPHILS # BLD AUTO: 3.02 THOUSANDS/ΜL (ref 1.85–7.62)
NEUTS SEG NFR BLD AUTO: 41 % (ref 43–75)
NRBC BLD AUTO-RTO: 0 /100 WBCS
PLATELET # BLD AUTO: 305 THOUSANDS/UL (ref 149–390)
PMV BLD AUTO: 9.8 FL (ref 8.9–12.7)
POTASSIUM SERPL-SCNC: 5 MMOL/L (ref 3.5–5.3)
RBC # BLD AUTO: 4.41 MILLION/UL (ref 3.81–5.12)
SODIUM SERPL-SCNC: 137 MMOL/L (ref 136–145)
TROPONIN I SERPL-MCNC: <0.02 NG/ML
WBC # BLD AUTO: 7.4 THOUSAND/UL (ref 4.31–10.16)

## 2021-08-13 PROCEDURE — 85025 COMPLETE CBC W/AUTO DIFF WBC: CPT | Performed by: EMERGENCY MEDICINE

## 2021-08-13 PROCEDURE — 85379 FIBRIN DEGRADATION QUANT: CPT | Performed by: EMERGENCY MEDICINE

## 2021-08-13 PROCEDURE — 36415 COLL VENOUS BLD VENIPUNCTURE: CPT | Performed by: EMERGENCY MEDICINE

## 2021-08-13 PROCEDURE — 99285 EMERGENCY DEPT VISIT HI MDM: CPT

## 2021-08-13 PROCEDURE — 80048 BASIC METABOLIC PNL TOTAL CA: CPT | Performed by: EMERGENCY MEDICINE

## 2021-08-13 PROCEDURE — 84484 ASSAY OF TROPONIN QUANT: CPT | Performed by: EMERGENCY MEDICINE

## 2021-08-13 PROCEDURE — 99285 EMERGENCY DEPT VISIT HI MDM: CPT | Performed by: EMERGENCY MEDICINE

## 2021-08-13 PROCEDURE — 81025 URINE PREGNANCY TEST: CPT | Performed by: EMERGENCY MEDICINE

## 2021-08-13 PROCEDURE — 93005 ELECTROCARDIOGRAM TRACING: CPT

## 2021-08-13 PROCEDURE — 10160 PNXR ASPIR ABSC HMTMA BULLA: CPT | Performed by: EMERGENCY MEDICINE

## 2021-08-13 PROCEDURE — 71045 X-RAY EXAM CHEST 1 VIEW: CPT

## 2021-08-13 PROCEDURE — 85610 PROTHROMBIN TIME: CPT | Performed by: EMERGENCY MEDICINE

## 2021-08-13 RX ORDER — ALBUTEROL SULFATE 90 UG/1
2 AEROSOL, METERED RESPIRATORY (INHALATION) EVERY 4 HOURS PRN
COMMUNITY
Start: 2014-11-04

## 2021-08-14 ENCOUNTER — APPOINTMENT (EMERGENCY)
Dept: CT IMAGING | Facility: HOSPITAL | Age: 41
End: 2021-08-14
Payer: COMMERCIAL

## 2021-08-14 VITALS
OXYGEN SATURATION: 99 % | HEIGHT: 63 IN | BODY MASS INDEX: 30.66 KG/M2 | RESPIRATION RATE: 13 BRPM | DIASTOLIC BLOOD PRESSURE: 69 MMHG | TEMPERATURE: 97.4 F | HEART RATE: 82 BPM | SYSTOLIC BLOOD PRESSURE: 123 MMHG | WEIGHT: 173.06 LBS

## 2021-08-14 LAB
INR PPP: 0.85 (ref 0.84–1.19)
PROTHROMBIN TIME: 11.5 SECONDS (ref 11.6–14.5)

## 2021-08-14 PROCEDURE — 71275 CT ANGIOGRAPHY CHEST: CPT

## 2021-08-14 PROCEDURE — 96372 THER/PROPH/DIAG INJ SC/IM: CPT

## 2021-08-14 RX ORDER — CHLORHEXIDINE GLUCONATE 0.12 MG/ML
15 RINSE ORAL 2 TIMES DAILY
Qty: 120 ML | Refills: 0 | Status: SHIPPED | OUTPATIENT
Start: 2021-08-14

## 2021-08-14 RX ORDER — CLINDAMYCIN HYDROCHLORIDE 150 MG/1
300 CAPSULE ORAL ONCE
Status: COMPLETED | OUTPATIENT
Start: 2021-08-14 | End: 2021-08-14

## 2021-08-14 RX ORDER — CLINDAMYCIN HYDROCHLORIDE 150 MG/1
300 CAPSULE ORAL EVERY 8 HOURS SCHEDULED
Qty: 42 CAPSULE | Refills: 0 | Status: SHIPPED | OUTPATIENT
Start: 2021-08-14 | End: 2021-08-21

## 2021-08-14 RX ADMIN — IOHEXOL 85 ML: 350 INJECTION, SOLUTION INTRAVENOUS at 00:43

## 2021-08-14 RX ADMIN — ENOXAPARIN SODIUM 30 MG: 30 INJECTION SUBCUTANEOUS at 01:54

## 2021-08-14 RX ADMIN — CLINDAMYCIN HYDROCHLORIDE 300 MG: 150 CAPSULE ORAL at 02:24

## 2021-08-14 NOTE — ED PROVIDER NOTES
History  Chief Complaint   Patient presents with    Shortness of Breath     Pt states SOB starting today   Back Pain     Mid back, dull, non-radiating    Numbness     Arm tingling and numbness for about a week    Leg Swelling     b/l LE swelling, just noticed today     49-year-old female with history of asthma presents for evaluation of multiple complaints  Patient reports over the last few days she has had a sensation of shortness of breath with exertion, she denies shortness of breath without rest   Patient states she does not need to stop her activities to catch her breath  She denies any cough, recent fevers or chills or illnesses  Patient does smoke daily  She has no history of VTE, denies unilateral leg swelling or pain  Patient has a sensation of tension in her back between her scapula, patient has had this sensation intermittently chronically  Patient denies chest pain, cardiac history in herself or her family that she is aware of  Patient states about a week ago she fell down some steps in her house, she does recall how many  Patient was able to get up immediately after the fall and denies head strike or loss of consciousness  Shortly after that patient began to experience an intermittent tingling sensation in her left forearm that she describes as vibrating  She is not remember specifically landing on her left arm  Patient is using her left arm in the room, she denies swelling or skin discolorations to the arm or hand  She does complain of a left shoulder pain that she noticed after a previous fall which resulted in a left wrist surgery  Patient believes she is experiencing bilateral hand and feet swelling, she believes that the swelling has decreased since this morning  Patient does admit that heat as well as being on her feet contributes to swelling  Patient denies alcohol use, admits to smoking methamphetamine last week            Prior to Admission Medications   Prescriptions Last Dose Informant Patient Reported? Taking? albuterol (Ventolin HFA) 90 mcg/act inhaler   Yes Yes   Sig: Inhale 2 puffs every 4 (four) hours as needed   ibuprofen (MOTRIN) 800 mg tablet   No No   Sig: Take 1 tablet (800 mg total) by mouth 3 (three) times a day for 12 doses As needed for pain      Facility-Administered Medications: None       Past Medical History:   Diagnosis Date    Asthma        Past Surgical History:   Procedure Laterality Date    EXPLORATORY LAPAROTOMY      FRACTURE SURGERY      TONSILLECTOMY AND ADENOIDECTOMY         History reviewed  No pertinent family history  I have reviewed and agree with the history as documented  E-Cigarette/Vaping     E-Cigarette/Vaping Substances     Social History     Tobacco Use    Smoking status: Current Every Day Smoker     Packs/day: 1 00     Types: Cigarettes    Smokeless tobacco: Never Used   Substance Use Topics    Alcohol use: No    Drug use: Yes     Types: Marijuana     Comment: marjuana/meth use a "few months ago" and "clean of heroin 9 months"       Review of Systems   Constitutional: Negative for appetite change, chills and fever  Respiratory: Positive for shortness of breath  Negative for cough  Cardiovascular: Negative for chest pain and palpitations  Gastrointestinal: Negative for abdominal pain, blood in stool, constipation, diarrhea, nausea and vomiting  Genitourinary: Negative for dysuria and menstrual problem  Musculoskeletal: Positive for arthralgias and myalgias  Negative for back pain and neck pain  Neurological: Negative for weakness and headaches  All other systems reviewed and are negative  Physical Exam  Physical Exam  Vitals reviewed  Constitutional:       General: She is not in acute distress  Appearance: She is well-developed  She is not ill-appearing, toxic-appearing or diaphoretic  HENT:      Head: Normocephalic and atraumatic        Right Ear: External ear normal       Left Ear: External ear normal  Mouth/Throat:      Lips: Pink  Mouth: Mucous membranes are moist       Dentition: Abnormal dentition  Dental caries and dental abscesses present  Eyes:      General:         Right eye: No discharge  Left eye: No discharge  Cardiovascular:      Rate and Rhythm: Normal rate and regular rhythm  Pulses: Normal pulses  Comments: B/l radial, DP pulses 2/4  Pulmonary:      Effort: Pulmonary effort is normal  No respiratory distress  Breath sounds: Normal breath sounds  No stridor  No wheezing, rhonchi or rales  Abdominal:      General: There is no distension  Palpations: Abdomen is soft  Tenderness: There is no abdominal tenderness  There is no guarding or rebound  Musculoskeletal:         General: No deformity or signs of injury  Right lower leg: No edema  Left lower leg: No edema  Comments: 5/5  strength, arm flexion/extension, able to raise b/l arms above head; no mid c/t/l spine tenderness; ambulatory to room without difficulty   Skin:     General: Skin is warm  Coloration: Skin is not jaundiced or pale  Neurological:      General: No focal deficit present  Mental Status: She is alert  Mental status is at baseline  Sensory: No sensory deficit  Motor: No weakness        Gait: Gait normal          Vital Signs  ED Triage Vitals [08/13/21 2244]   Temperature Pulse Respirations Blood Pressure SpO2   (!) 97 4 °F (36 3 °C) 85 16 138/66 99 %      Temp Source Heart Rate Source Patient Position - Orthostatic VS BP Location FiO2 (%)   Temporal Monitor Lying Left arm --      Pain Score       5           Vitals:    08/13/21 2244 08/14/21 0130 08/14/21 0225   BP: 138/66 123/69 123/69   Pulse: 85 82 82   Patient Position - Orthostatic VS: Lying Lying Lying         Visual Acuity  Visual Acuity      Most Recent Value   L Pupil Size (mm)  3   R Pupil Size (mm)  3          ED Medications  Medications   iohexol (OMNIPAQUE) 350 MG/ML injection (SINGLE-DOSE) 85 mL (85 mL Intravenous Given 8/14/21 0043)   enoxaparin (LOVENOX) subcutaneous injection 30 mg (30 mg Subcutaneous Given 8/14/21 0154)   clindamycin (CLEOCIN) capsule 300 mg (300 mg Oral Given 8/14/21 0224)       Diagnostic Studies  Results Reviewed     Procedure Component Value Units Date/Time    Protime-INR [[id number]]  (Abnormal) Collected: 08/13/21 2325    Lab Status: Final result Specimen: Blood Updated: 08/14/21 0001     Protime 11 5 seconds      INR 0 85    Troponin I [[id number]]  (Normal) Collected: 08/13/21 2325    Lab Status: Final result Specimen: Blood from Arm, Left Updated: 08/13/21 2350     Troponin I <0 02 ng/mL     D-Dimer [[id number]]  (Abnormal) Collected: 08/13/21 2325    Lab Status: Final result Specimen: Blood from Arm, Left Updated: 08/13/21 2344     D-Dimer, Quant 0 97 ug/ml FEU     Basic metabolic panel [[id number]]  (Abnormal) Collected: 08/13/21 2325    Lab Status: Final result Specimen: Blood from Arm, Left Updated: 08/13/21 2341     Sodium 137 mmol/L      Potassium 5 0 mmol/L      Chloride 104 mmol/L      CO2 30 mmol/L      ANION GAP 3 mmol/L      BUN 11 mg/dL      Creatinine 0 71 mg/dL      Glucose 92 mg/dL      Calcium 8 9 mg/dL      eGFR 105 ml/min/1 73sq m     Narrative:      [place] guidelines for Chronic Kidney Disease (CKD):     Stage 1 with normal or high GFR (GFR > 90 mL/min/1 73 square meters)    Stage 2 Mild CKD (GFR = 60-89 mL/min/1 73 square meters)    Stage 3A Moderate CKD (GFR = 45-59 mL/min/1 73 square meters)    Stage 3B Moderate CKD (GFR = 30-44 mL/min/1 73 square meters)    Stage 4 Severe CKD (GFR = 15-29 mL/min/1 73 square meters)    Stage 5 End Stage CKD (GFR <15 mL/min/1 73 square meters)  Note: GFR calculation is accurate only with a steady state creatinine    POCT pregnancy, urine [[id number]]  (Normal) Resulted: 08/13/21 2335    Lab Status: Final result Updated: 08/13/21 2335     EXT PREG TEST UR (Ref: Negative) negative Control valid    CBC and differential [[id number]]  (Abnormal) Collected: 08/13/21 2325    Lab Status: Final result Specimen: Blood from Arm, Left Updated: 08/13/21 2332     WBC 7 40 Thousand/uL      RBC 4 41 Million/uL      Hemoglobin 12 7 g/dL      Hematocrit 40 4 %      MCV 92 fL      MCH 28 8 pg      MCHC 31 4 g/dL      RDW 13 4 %      MPV 9 8 fL      Platelets 826 Thousands/uL      nRBC 0 /100 WBCs      Neutrophils Relative 41 %      Immat GRANS % 0 %      Lymphocytes Relative 44 %      Monocytes Relative 9 %      Eosinophils Relative 5 %      Basophils Relative 1 %      Neutrophils Absolute 3 02 Thousands/µL      Immature Grans Absolute 0 03 Thousand/uL      Lymphocytes Absolute 3 22 Thousands/µL      Monocytes Absolute 0 69 Thousand/µL      Eosinophils Absolute 0 37 Thousand/µL      Basophils Absolute 0 07 Thousands/µL                  CTA ED chest PE Study   Final Result by Bull Coffey MD (08/14 0117)      No intraluminal filling defect to suggest an acute pulmonary embolus  No infiltrate or pleural effusion  Workstation performed: [id number]         XR chest 1 view portable   ED Interpretation by Panfilo Pratt DO (08/14 0007)   No acute cardiopulmonary disease, no fracture      VAS upper limb venous duplex scan, unilateral/limited    (Results Pending)              Procedures  Incision and drain    Date/Time: 8/14/2021 2:12 AM  Performed by: Panfilo Pratt DO  Authorized by: Panfilo Pratt DO   Universal Protocol:  Consent: Verbal consent obtained    Risks and benefits: risks, benefits and alternatives were discussed  Consent given by: patient  Required items: required blood products, implants, devices, and special equipment available  Patient identity confirmed: verbally with patient      Patient location:  ED  Location:     Type:  Abscess    Size:  <1cm    Location:  Mouth    Location Detail:  Intraoral    Mouth location:  Alveolar process  Anesthesia (see MAR for exact dosages): Anesthesia method:  None  Procedure details:     Complexity:  Simple    Needle aspiration: yes      Needle size:  18 G    Incision types:  Stab incision    Approach:  Open    Incision depth:  Subcutaneous    Drainage:  Purulent and bloody    Drainage amount:  Scant    Wound treatment:  Wound left open    Packing materials:  None  Post-procedure details:     Patient tolerance of procedure: Tolerated well, no immediate complications             ED Course  ED Course as of Aug 14 0339   Fri Aug 13, 2021   2246 Pulse: 85   2246 SpO2: 99 %   2249 Procedure Note: EKG  Date/Time: 08/13/21 10:49 PM   Interpreted by: Giovanna Irwin  Indications / Diagnosis: dyspnea  ECG reviewed by me, the ED Provider: yes   The EKG demonstrates:  Rhythm: normal sinus  Intervals: normal intervals  Axis: right axis  QRS/Blocks: normal QRS  ST Changes: No acute ST Changes, no STD/SOL           2348 Will order CTA PE study, if negative for PE will administer lovenox and order duplex US for AM, discharge  D-Dimer, Quant(!): 0 97   2353 Troponin I: <0 02   Sat Aug 14, 2021   0208 Explained results of labs and imaging to patient, explained elevated dimer and arm symptoms may be from VTE due to trauma (fall from stairs), will order lovenox and have patient call in morning for ultrasound  Patient then stating facial swelling and dental infection  On inspection she has poor dentition, soft sub-lingual area, mild facial swelling without erythema, small dental abscess on right upper near tooth 6  Will drain, give abx, dental f/u information                  HEART Risk Score      Most Recent Value   Heart Score Risk Calculator   History  0 Filed at: 08/13/2021 2356   ECG  0 Filed at: 08/13/2021 2356   Age  0 Filed at: 08/13/2021 2356   Risk Factors  0 Filed at: 08/13/2021 2356   Troponin  0 Filed at: 08/13/2021 2356   HEART Score  0 Filed at: 08/13/2021 2356                      SBIRT 22yo+      Most Recent Value   SBIRT (25 yo +)   In order to provide better care to our patients, we are screening all of our patients for alcohol and drug use  Would it be okay to ask you these screening questions? Yes Filed at: 08/13/2021 2248   Initial Alcohol Screen: US AUDIT-C    1  How often do you have a drink containing alcohol?  0 Filed at: 08/13/2021 2248   2  How many drinks containing alcohol do you have on a typical day you are drinking? 0 Filed at: 08/13/2021 2248   3b  FEMALE Any Age, or MALE 65+: How often do you have 4 or more drinks on one occassion? 0 Filed at: 08/13/2021 2248   Audit-C Score  0 Filed at: 08/13/2021 2248   JUNIOR: How many times in the past year have you    Used an illegal drug or used a prescription medication for non-medical reasons? Never Filed at: 08/13/2021 2248          Wells' Criteria for PE      Most Recent Value   Wells' Criteria for PE   Clinical signs and symptoms of DVT  0 Filed at: 08/13/2021 2356   PE is primary diagnosis or equally likely  3 Filed at: 08/13/2021 2356   HR >100  0 Filed at: 08/13/2021 2356   Immobilization at least 3 days or Surgery in the previous 4 weeks  0 Filed at: 08/13/2021 2356   Previous, objectively diagnosed PE or DVT  0 Filed at: 08/13/2021 2356   Hemoptysis  0 Filed at: 08/13/2021 2356   Malignancy with treatment within 6 months or palliative  0 Filed at: 08/13/2021 2356   Wells' Criteria Total  3 Filed at: 08/13/2021 2356                MDM  Number of Diagnoses or Management Options  Arm paresthesia, left  Dental abscess  Diagnosis management comments: 78-year-old female presents for evaluation of multiple complaints  Patient has complaints of dyspnea with exertion, back tension like feeling but she has had chronically intermittently, left forearm tingling sensation  On evaluation patient is well appearing, vital signs are within normal   Will evaluate patient's complaints with CBC, BMP, D-dimer, troponin, EKG, chest x-ray  Possibly pursue CT imaging, vascular studies  Patient likely discharge home and follow-up with a primary care provider, patient reports she recently obtained insurance  Disposition  Final diagnoses:   Dental abscess   Arm paresthesia, left     Time reflects when diagnosis was documented in both MDM as applicable and the Disposition within this note     Time User Action Codes Description Comment    8/14/2021  2:13 AM Julianne Fernanda Add [K04 7] Dental abscess     8/14/2021  2:13 AM Julianne Fernanda Add [R20 2] Arm paresthesia, left     8/14/2021  2:13 AM Julianne Fernanda Modify [K04 7] Dental abscess     8/14/2021  2:13 AM Julianne Fernanda Modify [R20 2] Arm paresthesia, left       ED Disposition     ED Disposition Condition Date/Time Comment    Discharge Stable Sat Aug 14, 2021  2:13 AM Jose Saldivar discharge to home/self care  Follow-up Information     Follow up With Specialties Details Why Contact Info    [employer]    [phone]            Discharge Medication List as of 8/14/2021  2:14 AM      START taking these medications    Details   chlorhexidine (PERIDEX) 0 12 % solution Apply 15 mL to the mouth or throat 2 (two) times a day, Starting Sat 8/14/2021, Normal      clindamycin (CLEOCIN) 150 mg capsule Take 2 capsules (300 mg total) by mouth every 8 (eight) hours for 7 days, Starting Sat 8/14/2021, Until Sat 8/21/2021, Normal         CONTINUE these medications which have NOT CHANGED    Details   albuterol (Ventolin HFA) 90 mcg/act inhaler Inhale 2 puffs every 4 (four) hours as needed, Starting Tue 11/4/2014, Historical Med      ibuprofen (MOTRIN) 800 mg tablet Take 1 tablet (800 mg total) by mouth 3 (three) times a day for 12 doses As needed for pain, Starting Tue 7/9/2019, Until Sat 7/13/2019, Print           No discharge procedures on file      PDMP Review     None          ED Provider  Electronically Signed by           Mandeep Yang DO  08/14/21 8199

## 2021-08-17 LAB
ATRIAL RATE: 85 BPM
P AXIS: 40 DEGREES
PR INTERVAL: 120 MS
QRS AXIS: 83 DEGREES
QRSD INTERVAL: 72 MS
QT INTERVAL: 364 MS
QTC INTERVAL: 433 MS
T WAVE AXIS: 47 DEGREES
VENTRICULAR RATE: 85 BPM

## 2021-08-17 PROCEDURE — 93010 ELECTROCARDIOGRAM REPORT: CPT | Performed by: INTERNAL MEDICINE

## 2021-09-03 ENCOUNTER — HOSPITAL ENCOUNTER (EMERGENCY)
Facility: HOSPITAL | Age: 41
Discharge: HOME | End: 2021-09-03
Attending: EMERGENCY MEDICINE | Admitting: EMERGENCY MEDICINE
Payer: COMMERCIAL

## 2021-09-03 VITALS
DIASTOLIC BLOOD PRESSURE: 67 MMHG | TEMPERATURE: 97.6 F | SYSTOLIC BLOOD PRESSURE: 111 MMHG | HEIGHT: 63 IN | HEART RATE: 81 BPM | BODY MASS INDEX: 31.25 KG/M2 | OXYGEN SATURATION: 97 % | RESPIRATION RATE: 18 BRPM | WEIGHT: 176.37 LBS

## 2021-09-03 DIAGNOSIS — K04.7 DENTAL ABSCESS: Primary | ICD-10-CM

## 2021-09-03 PROCEDURE — 10160 PNXR ASPIR ABSC HMTMA BULLA: CPT | Performed by: EMERGENCY MEDICINE

## 2021-09-03 PROCEDURE — 99284 EMERGENCY DEPT VISIT MOD MDM: CPT | Performed by: EMERGENCY MEDICINE

## 2021-09-03 PROCEDURE — 99282 EMERGENCY DEPT VISIT SF MDM: CPT

## 2021-09-03 RX ORDER — LIDOCAINE HYDROCHLORIDE AND EPINEPHRINE 10; 10 MG/ML; UG/ML
5 INJECTION, SOLUTION INFILTRATION; PERINEURAL ONCE
Status: DISCONTINUED | OUTPATIENT
Start: 2021-09-03 | End: 2021-09-03 | Stop reason: HOSPADM

## 2021-09-03 RX ORDER — CLINDAMYCIN HYDROCHLORIDE 150 MG/1
300 CAPSULE ORAL ONCE
Status: COMPLETED | OUTPATIENT
Start: 2021-09-03 | End: 2021-09-03

## 2021-09-03 RX ORDER — ACETAMINOPHEN 325 MG/1
650 TABLET ORAL ONCE
Status: COMPLETED | OUTPATIENT
Start: 2021-09-03 | End: 2021-09-03

## 2021-09-03 RX ORDER — IBUPROFEN 800 MG/1
800 TABLET ORAL ONCE
Status: COMPLETED | OUTPATIENT
Start: 2021-09-03 | End: 2021-09-03

## 2021-09-03 RX ORDER — CLINDAMYCIN HYDROCHLORIDE 300 MG/1
300 CAPSULE ORAL 3 TIMES DAILY
Qty: 30 CAPSULE | Refills: 0 | Status: SHIPPED | OUTPATIENT
Start: 2021-09-03 | End: 2021-09-13

## 2021-09-03 RX ORDER — CHLORHEXIDINE GLUCONATE 0.12 MG/ML
15 RINSE ORAL 2 TIMES DAILY
Qty: 120 ML | Refills: 0 | Status: SHIPPED | OUTPATIENT
Start: 2021-09-03

## 2021-09-03 RX ORDER — IBUPROFEN 800 MG/1
800 TABLET ORAL EVERY 8 HOURS PRN
Qty: 21 TABLET | Refills: 0 | Status: SHIPPED | OUTPATIENT
Start: 2021-09-03

## 2021-09-03 RX ORDER — BUPIVACAINE HYDROCHLORIDE AND EPINEPHRINE 5; 5 MG/ML; UG/ML
1.8 INJECTION, SOLUTION EPIDURAL; INTRACAUDAL; PERINEURAL ONCE
Status: DISCONTINUED | OUTPATIENT
Start: 2021-09-03 | End: 2021-09-03

## 2021-09-03 RX ADMIN — IBUPROFEN 800 MG: 800 TABLET, FILM COATED ORAL at 17:37

## 2021-09-03 RX ADMIN — CLINDAMYCIN HYDROCHLORIDE 300 MG: 150 CAPSULE ORAL at 17:37

## 2021-09-03 RX ADMIN — BENZOCAINE 1 APPLICATION: 220 GEL, DENTIFRICE DENTAL at 17:37

## 2021-09-03 RX ADMIN — ACETAMINOPHEN 650 MG: 325 TABLET, FILM COATED ORAL at 17:37

## 2021-09-03 NOTE — ED PROVIDER NOTES
EMERGENCY DEPARTMENT ENCOUNTER NOTE    This note has been generated using a voice recognition software  There may be typographic, grammatic, or word substitution errors that have escaped editorial review  ? CHIEF COMPLAINT  Chief Complaint   Patient presents with    Abscess     complains of a dental abscess on the left upper side of her mouth  started about 3 days ago  reports difficulty chewing food  HPI  Jaciel Burgess is a 35 y o  female with PMH of asthma, caries, presenting with dental pain  Patient reports having issues with a dental abscess in the past and 3 days ago developed worsening pain in right upper molar region (note that triage note states left, however, pain is on the right side)  She has been using Peridex to help with the pain  Pain is worsening  Patient reports being started on clindamycin and Peridex on 08/13 which significantly improved symptoms, however, she has so far been unable to secure an appointment with a dentist because they are all very backed up  No fevers  No difficulty with swallowing  No difficulty with mouth opening  No difficulty with breathing  REVIEW OF SYSTEMS    Constitutional: denies fevers, chills  Visual/Eyes: no changes in vision  HENT:  Dental pain as above, no difficulty swallowing and no trismus  Cardiac: no chest pain  Respiratory:  History of asthma, no shortness of breath  GI: no nausea or diarrhea with clindamycin  Neuro: no focal weakness or numbness, no headaches    Ten systems reviewed and negative unless otherwise noted in HPI and above    PAST MEDICAL HISTORY  Past Medical History:   Diagnosis Date    Asthma        SURGICAL HISTORY  Past Surgical History:   Procedure Laterality Date    EXPLORATORY LAPAROTOMY      FRACTURE SURGERY      TONSILLECTOMY AND ADENOIDECTOMY         FAMILY HISTORY  History reviewed  No pertinent family history  CURRENT MEDICATIONS  No current facility-administered medications on file prior to encounter  Current Outpatient Medications on File Prior to Encounter   Medication Sig    chlorhexidine (PERIDEX) 0 12 % solution Apply 15 mL to the mouth or throat 2 (two) times a day    albuterol (Ventolin HFA) 90 mcg/act inhaler Inhale 2 puffs every 4 (four) hours as needed    ibuprofen (MOTRIN) 800 mg tablet Take 1 tablet (800 mg total) by mouth 3 (three) times a day for 12 doses As needed for pain       ALLERGIES  Allergies   Allergen Reactions    Penicillins Anaphylaxis       SOCIAL HISTORY  Social History     Socioeconomic History    Marital status: Single     Spouse name: None    Number of children: None    Years of education: None    Highest education level: None   Occupational History    None   Tobacco Use    Smoking status: Current Every Day Smoker     Packs/day: 1 00     Types: Cigarettes    Smokeless tobacco: Never Used   Substance and Sexual Activity    Alcohol use: No    Drug use: Yes     Types: Marijuana     Comment: marjuana/meth use a "few months ago" and "clean of heroin 9 months"    Sexual activity: Yes   Other Topics Concern    None   Social History Narrative    None     Social Determinants of Health     Financial Resource Strain:     Difficulty of Paying Living Expenses:    Food Insecurity:     Worried About Running Out of Food in the Last Year:     Ran Out of Food in the Last Year:    Transportation Needs:     Lack of Transportation (Medical):      Lack of Transportation (Non-Medical):    Physical Activity:     Days of Exercise per Week:     Minutes of Exercise per Session:    Stress:     Feeling of Stress :    Social Connections:     Frequency of Communication with Friends and Family:     Frequency of Social Gatherings with Friends and Family:     Attends Rastafarian Services:     Active Member of Clubs or Organizations:     Attends Club or Organization Meetings:     Marital Status:    Intimate Partner Violence:     Fear of Current or Ex-Partner:     Emotionally Abused:  Physically Abused:     Sexually Abused:        PHYSICAL EXAM    /70 (BP Location: Right arm)   Pulse 82   Temp 97 6 °F (36 4 °C) (Temporal)   Resp 18   Ht 5' 3" (1 6 m)   Wt 80 kg (176 lb 5 9 oz)   LMP 09/03/2021   SpO2 97%   BMI 31 24 kg/m²   Vital signs and nursing notes reviewed    CONSTITUTIONAL: female appearing stated age resting in bed, in no acute distress  HEENT: atraumatic, normocephalic  Sclera anicteric, conjunctiva are not injected  Moist oral mucosa  No trismus  Floor of mouth soft  Teeth #3 and #4 are with a fluctuant gingival abscess that is already starting to drain  CARDIOVASCULAR/CHEST: Appears well perfused  PULMONARY: Breathing comfortably on RA  ABDOMEN: non-distended  MSK: moves all extremities, no deformities, no peripheral edema, no calf asymmetry  NEURO: Awake, alert, and oriented x 3  Face symmetric  Moves all extremities spontaneously  No focal neurologic deficits  SKIN: Warm, appears well-perfused  MENTAL STATUS: Normal affect      ED COURSE & MEDICAL DECISION MAKING  Incision and drain    Date/Time: 9/3/2021 5:37 PM  Performed by: Carley Vergara MD  Authorized by: Carley Vergara MD   Universal Protocol:  Consent: Verbal consent obtained  Patient identity confirmed: verbally with patient      Patient location:  ED  Location:     Type:  Abscess    Size:  1 cm    Location: Mouth  Anesthesia (see MAR for exact dosages): Anesthesia method:  Local infiltration    Local anesthetic:  Lidocaine 1% WITH epi  Procedure details:     Complexity:  Simple    Needle aspiration: yes      Needle size:  25 G    Aspiration type: puncture aspiration      Approach:  Puncture    Incision depth:  Submucosal    Drainage:  Purulent    Drainage amount: Moderate    Wound treatment:  Wound left open    Packing materials:  None  Post-procedure details:     Patient tolerance of procedure:   Tolerated well, no immediate complications                 Medications   ibuprofen (MOTRIN) tablet 800 mg (800 mg Oral Given 9/3/21 1737)   acetaminophen (TYLENOL) tablet 650 mg (650 mg Oral Given 9/3/21 1737)   clindamycin (CLEOCIN) capsule 300 mg (300 mg Oral Given 9/3/21 1737)   BENZOCAINE (DENTAL) 20 % swab 1 application (1 application Oral Given 3/1/00 167)     35year old female presenting with dental pain and gingival abscess  VS reviewed, afebrile and WNL  No evidence of deep space infection  Local anesthesia with lidocaine with epinephrine administered and needle aspiration performed  First dose of clindamycin administered  Patient encouraged to follow up with Dental clinic as soon as possible since antibiotics will only temporarily address the problem  Patient discharged to home with recommendations for symptom control, return precautions, and plan for follow up  MDM  Number of Diagnoses or Management Options  Dental abscess: new and does not require workup     Amount and/or Complexity of Data Reviewed  Review and summarize past medical records: yes    Risk of Complications, Morbidity, and/or Mortality  Presenting problems: moderate  Diagnostic procedures: low  Management options: moderate    Patient Progress  Patient progress: improved      CLINICAL IMPRESSION  Final diagnoses:   Dental abscess       DISPOSITION  Time reflects when diagnosis was documented in both MDM as applicable and the Disposition within this note     Time User Action Codes Description Comment    9/3/2021  5:18 PM Yulia Oliveros Add [K04 7] Dental abscess       ED Disposition     ED Disposition Condition Date/Time Comment    Discharge Stable Fri Sep 3, 2021  5:18 PM Katie Lambert discharge to home/self care              Follow-up Information     Follow up With Specialties Details Why Contact Info Additional [address]  Call in 3 days Emergency Room Follow-up [address]  [address]  [address] [email or web address] Emergency Department Emergency Medicine Go to  As needed, If symptoms worsen [place] [phone]  [facility] Emergency Department, [address], [address]   Discharge Medication List as of 9/3/2021  5:34 PM      START taking these medications    Details   !! chlorhexidine (PERIDEX) 0 12 % solution Apply 15 mL to the mouth or throat 2 (two) times a day, Starting Fri 9/3/2021, Normal      clindamycin (CLEOCIN) 300 MG capsule Take 1 capsule (300 mg total) by mouth 3 (three) times a day for 10 days, Starting Fri 9/3/2021, Until Mon 9/13/2021, Normal       !! - Potential duplicate medications found  Please discuss with provider  CONTINUE these medications which have CHANGED    Details   ibuprofen (MOTRIN) 800 mg tablet Take 1 tablet (800 mg total) by mouth every 8 (eight) hours as needed for moderate pain, Starting Fri 9/3/2021, Normal         CONTINUE these medications which have NOT CHANGED    Details   !! chlorhexidine (PERIDEX) 0 12 % solution Apply 15 mL to the mouth or throat 2 (two) times a day, Starting Sat 8/14/2021, Normal      albuterol (Ventolin HFA) 90 mcg/act inhaler Inhale 2 puffs every 4 (four) hours as needed, Starting Tue 11/4/2014, Historical Med       !! - Potential duplicate medications found  Please discuss with provider             Isela George MD  09/13/21 [id number]

## 2021-09-03 NOTE — DISCHARGE INSTRUCTIONS
Start taking clindamycin for your current dental infection  Continue using Peridex oral rinse  He may take Tylenol and/or ibuprofen to help with the pain  Please follow-up with dentistry and oral surgery as soon as possible  Antibiotics will temporarily fix the problem but will not resolve the recurrent infections you have been getting  Seek medical attention if you are having worsening swelling, severe pain, fevers, difficulty swallowing, or difficulty breathing

## 2023-02-08 ENCOUNTER — VBI (OUTPATIENT)
Dept: ADMINISTRATIVE | Facility: OTHER | Age: 43
End: 2023-02-08

## 2023-12-11 ENCOUNTER — OFFICE VISIT (OUTPATIENT)
Dept: INTERNAL MEDICINE CLINIC | Facility: CLINIC | Age: 43
End: 2023-12-11

## 2023-12-11 ENCOUNTER — APPOINTMENT (EMERGENCY)
Dept: CT IMAGING | Facility: HOSPITAL | Age: 43
End: 2023-12-11
Payer: COMMERCIAL

## 2023-12-11 ENCOUNTER — HOSPITAL ENCOUNTER (EMERGENCY)
Facility: HOSPITAL | Age: 43
Discharge: HOME | End: 2023-12-11
Attending: EMERGENCY MEDICINE
Payer: COMMERCIAL

## 2023-12-11 VITALS
BODY MASS INDEX: 32.28 KG/M2 | RESPIRATION RATE: 20 BRPM | WEIGHT: 194 LBS | SYSTOLIC BLOOD PRESSURE: 149 MMHG | TEMPERATURE: 98.6 F | HEART RATE: 118 BPM | OXYGEN SATURATION: 95 % | DIASTOLIC BLOOD PRESSURE: 80 MMHG

## 2023-12-11 VITALS
WEIGHT: 194 LBS | TEMPERATURE: 98.9 F | BODY MASS INDEX: 32.32 KG/M2 | DIASTOLIC BLOOD PRESSURE: 90 MMHG | OXYGEN SATURATION: 99 % | HEIGHT: 65 IN | SYSTOLIC BLOOD PRESSURE: 148 MMHG | HEART RATE: 124 BPM

## 2023-12-11 DIAGNOSIS — F32.1 CURRENT MODERATE EPISODE OF MAJOR DEPRESSIVE DISORDER WITHOUT PRIOR EPISODE (HCC): ICD-10-CM

## 2023-12-11 DIAGNOSIS — Z91.410 HISTORY OF RAPE IN ADULTHOOD: ICD-10-CM

## 2023-12-11 DIAGNOSIS — F43.10 PTSD (POST-TRAUMATIC STRESS DISORDER): ICD-10-CM

## 2023-12-11 DIAGNOSIS — Z51.81 ENCOUNTER FOR THERAPEUTIC DRUG LEVEL MONITORING: ICD-10-CM

## 2023-12-11 DIAGNOSIS — F51.4 NIGHT TERRORS, ADULT: ICD-10-CM

## 2023-12-11 DIAGNOSIS — F19.20 DRUG DEPENDENCY (HCC): Primary | ICD-10-CM

## 2023-12-11 DIAGNOSIS — Z87.42 HISTORY OF PID: ICD-10-CM

## 2023-12-11 DIAGNOSIS — Z79.899 ENCOUNTER FOR MONITORING SUBOXONE MAINTENANCE THERAPY: ICD-10-CM

## 2023-12-11 DIAGNOSIS — Z51.81 ENCOUNTER FOR MONITORING SUBOXONE MAINTENANCE THERAPY: ICD-10-CM

## 2023-12-11 DIAGNOSIS — Z72.0 TOBACCO USE: ICD-10-CM

## 2023-12-11 DIAGNOSIS — Z86.19 HISTORY OF CHLAMYDIA: ICD-10-CM

## 2023-12-11 DIAGNOSIS — F41.1 GAD (GENERALIZED ANXIETY DISORDER): ICD-10-CM

## 2023-12-11 DIAGNOSIS — S22.41XA CLOSED FRACTURE OF MULTIPLE RIBS OF RIGHT SIDE, INITIAL ENCOUNTER: Primary | ICD-10-CM

## 2023-12-11 DIAGNOSIS — K58.9 IRRITABLE BOWEL SYNDROME, UNSPECIFIED TYPE: ICD-10-CM

## 2023-12-11 PROBLEM — S61.452A DOG BITE, HAND, LEFT, INITIAL ENCOUNTER: Status: RESOLVED | Noted: 2019-07-09 | Resolved: 2023-12-11

## 2023-12-11 PROBLEM — S60.311A ABRASION OF RIGHT THUMB: Status: RESOLVED | Noted: 2019-07-09 | Resolved: 2023-12-11
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
## 2023-12-11 PROBLEM — S61.219A FINGER LACERATION: Status: RESOLVED | Noted: 2019-07-09 | Resolved: 2023-12-11

## 2023-12-11 PROCEDURE — 90715 TDAP VACCINE 7 YRS/> IM: CPT | Performed by: EMERGENCY MEDICINE

## 2023-12-11 PROCEDURE — 12001 RPR S/N/AX/GEN/TRNK 2.5CM/<: CPT | Performed by: EMERGENCY MEDICINE

## 2023-12-11 PROCEDURE — 71250 CT THORAX DX C-: CPT

## 2023-12-11 PROCEDURE — G1004 CDSM NDSC: HCPCS

## 2023-12-11 PROCEDURE — 90471 IMMUNIZATION ADMIN: CPT

## 2023-12-11 PROCEDURE — 99205 OFFICE O/P NEW HI 60 MIN: CPT | Performed by: INTERNAL MEDICINE

## 2023-12-11 PROCEDURE — 99284 EMERGENCY DEPT VISIT MOD MDM: CPT | Performed by: EMERGENCY MEDICINE

## 2023-12-11 PROCEDURE — 99284 EMERGENCY DEPT VISIT MOD MDM: CPT

## 2023-12-11 RX ORDER — BUPRENORPHINE AND NALOXONE 8; 2 MG/1; MG/1
FILM, SOLUBLE BUCCAL; SUBLINGUAL
Qty: 2 FILM | Refills: 0 | Status: SHIPPED | OUTPATIENT
Start: 2023-12-11 | End: 2023-12-11 | Stop reason: SDUPTHER

## 2023-12-11 RX ORDER — BUPRENORPHINE AND NALOXONE 8; 2 MG/1; MG/1
FILM, SOLUBLE BUCCAL; SUBLINGUAL
Qty: 14 FILM | Refills: 0 | Status: SHIPPED | OUTPATIENT
Start: 2023-12-11

## 2023-12-11 RX ORDER — NALOXONE HYDROCHLORIDE 4 MG/.1ML
1 SPRAY NASAL AS NEEDED
Qty: 1 EACH | Refills: 1 | Status: SHIPPED | OUTPATIENT
Start: 2023-12-11

## 2023-12-11 RX ORDER — NAPROXEN 500 MG/1
500 TABLET ORAL 2 TIMES DAILY WITH MEALS
Qty: 10 TABLET | Refills: 0 | Status: SHIPPED | OUTPATIENT
Start: 2023-12-11 | End: 2023-12-16

## 2023-12-11 RX ADMIN — TETANUS TOXOID, REDUCED DIPHTHERIA TOXOID AND ACELLULAR PERTUSSIS VACCINE, ADSORBED 0.5 ML: 5; 2.5; 8; 8; 2.5 SUSPENSION INTRAMUSCULAR at 18:29

## 2023-12-11 NOTE — PATIENT INSTRUCTIONS
Naloxone (Into the nose)   Naloxone Hydrochloride (nal-OX-one aly-droe-KLOR-jimmie)  Treats opioid overdose in an emergency situation. Must be given as soon as possible. Brand Name(s): Kloxxado, Narcan   There may be other brand names for this medicine. When This Medicine Should Not Be Used: This medicine is not right for everyone. Do not use it if you had an allergic reaction to naloxone. How to Use This Medicine:   Spray  Your doctor will tell you how much medicine to use. Do not use more than directed. Your doctor or a pharmacist can tell you where to buy this medicine. It is available without a doctor's order at most major pharmacies. Follow the instructions on the medicine label if you are using this medicine without a prescription. This medicine must be given to you (the patient) by someone else. Talk with people close to you so they know what to do in case of an emergency. Read and follow the patient instructions that come with this medicine. Talk to your doctor or pharmacist if you have any questions. This medicine is for use only in the nose. Do not get any of it in your eyes or on your skin. If it does get on these areas, rinse it off right away. To use:   Each nasal spray contains only one dose of naloxone. Do not prime or test the nasal spray. Hold the nasal spray with your thumb on the bottom of the plunger and your first and middle fingers on either side of the nozzle. Dalton Hansen the patient on his or her back. Support the patient's neck with your hand and let the head tilt back. Gently insert the tip of the nozzle into one nostril, until your fingers on either side of the nozzle are against the bottom of the patient's nose. Press the plunger firmly to give the dose. Remove the nasal spray from the patient's nose. Move the patient on his or her side (recovery position). Get emergency medical help right away. Watch the patient closely.  If needed, you may give more doses every 2 to 3 minutes until the patient responds. Use a new nasal spray for each dose and spray the medicine into the other nostril each time. Store the medicine in a closed container at room temperature, away from heat, moisture, and direct light. Do not freeze or expose to excessive heat. If the spray is frozen and is needed in an emergency, do not wait for the spray to thaw. Get medical help right away. However, the spray may be thawed for 15 minutes in room temperature, and it can still be used if it has been thawed after being frozen before. Ask your pharmacist about the best way to dispose of medicine that you do not use. Drugs and Foods to Avoid:      Ask your doctor or pharmacist before using any other medicine, including over-the-counter medicines, vitamins, and herbal products. Warnings While Using This Medicine:   Tell your doctor if you are pregnant or breastfeeding, or if you have heart or blood vessel disease. This medicine should be given right away after a suspected or known overdose of an opioid (narcotic) medicine. This will help prevent serious breathing problems and severe sleepiness that can lead to death. The effects of the opioid medicine may last longer than the effects of the naloxone. This means the breathing problems and sleepiness could come back. Always call for emergency help after the first dose of naloxone. This medicine could cause withdrawal symptoms from the opioid medicine. Keep all medicine out of the reach of children. Never share your medicine with anyone. Possible Side Effects While Using This Medicine:   Call your doctor right away if you notice any of these side effects:   Allergic reaction: Itching or hives, swelling in your face or hands, swelling or tingling in your mouth or throat, chest tightness, trouble breathing  Crying more than usual (in babies)  Diarrhea, nausea, vomiting, stomach cramps or pain  Fast, pounding, or uneven heartbeat  Fever, runny nose, sneezing, sweating, yawning, discomfort in the nose  Lightheadedness, dizziness, fainting  Ongoing trouble breathing  Seizure, shaking, or feeling restless, nervous, or irritable  Unusual tiredness or weakness  If you notice these less serious side effects, talk with your doctor:   Headache  Joint or muscle pain  If you notice other side effects that you think are caused by this medicine, tell your doctor. Call your doctor for medical advice about side effects. You may report side effects to FDA at 2-108-RIM-5224  © Copyright Halima Ivey 2023 Information is for End User's use only and may not be sold, redistributed or otherwise used for commercial purposes. The above information is an  only. It is not intended as medical advice for individual conditions or treatments. Talk to your doctor, nurse or pharmacist before following any medical regimen to see if it is safe and effective for you. Buprenorphine/Naloxone (Into the mouth)   Buprenorphine (bue-pre-NOR-feen), Naloxone (nal-OX-one)  Treats narcotic dependence. Brand Name(s): Suboxone, Zubsolv   There may be other brand names for this medicine. When This Medicine Should Not Be Used: This medicine is not right for everyone. Do not use it if you had an allergic reaction to buprenorphine or naloxone. How to Use This Medicine: Thin Sheet, Tablet  Take your medicine as directed. Your dose may need to be changed several times to find what works best for you. You must let the medicine dissolve. Never swallow the film or tablet. Your body may not absorb enough of the medicine if you swallow it. Your health caregiver should show you how to use the medicine. If you do not understand, ask for help. It is important to use the medicine correctly. Do not talk while the medicine is inside your mouth. Buccal film: Rinse your mouth with water to moisten it. Place the film against the inside of your cheek.  If your doctor told you to use more than 1 film, place the second film inside your other cheek. Do not place more than 2 films inside of 1 cheek at a time. Do not move or touch the film. Do not eat or drink anything until the film is completely dissolved. Sublingual tablet: Place the tablet under your tongue. If your doctor told you to use more than 1 tablet, place all of the tablets in different places under your tongue at the same time. You can use 2 tablets at a time until you have taken all of the medicine, if that is easier for you. Let the tablets dissolve completely in your mouth. Do not eat or drink anything until the tablets are completely dissolved. Rinse your mouth with water and swallow. Wait for at least one hour before brushing your teeth. Sublingual film: Drink some water to help moisten your mouth. Place the film under your tongue. If your doctor told you to use more than 1 film, place the second film on the opposite side from the first one. Do not move the film after you placed it under your tongue. If you are supposed to use more than 2 films, use them the same way, but do not start until the first 2 films are completely dissolved. Do not eat or drink anything until the film is completely dissolved. Do not break, crush, chew, or cut the film or tablet. This medicine should come with a Medication Guide. Ask your pharmacist for a copy if you do not have one. Missed dose: Take a dose as soon as you remember. If it is almost time for your next dose, wait until then and take a regular dose. Do not take extra medicine to make up for a missed dose. Store the medicine in a closed container at room temperature, away from heat, moisture, and direct light. Drop off any unused narcotic medicine at a drug take-back location right away. If you do not have a drug take-back location near you, flush any unused narcotic medicine down the toilet. Check your local drug store and clinics for take-back locations. You can also check the [employer] web site for locations.  Here is the link to the FDA safe disposal of medicines DrivePages.com.ee  Drugs and Foods to Avoid:   Ask your doctor or pharmacist before using any other medicine, including over-the-counter medicines, vitamins, and herbal products. Do not use this medicine if you are using or have used an MAO inhibitor within the past 14 days. Some medicines can affect how buprenorphine/naloxone works. Tell your doctor if you are using the following:   Carbamazepine, cyclobenzaprine, erythromycin, ketoconazole, metaxalone, mirtazapine, phenobarbital, phenytoin, rifampin, tramadol, trazodone  Diuretic (water pill)  Medicine to treat depression or mental health problems (including SNRIs, SSRIs, TCAs)  Medicine to treat HIV/AIDS (including atazanavir, delavirdine, efavirenz, etravirine, nevirapine, ritonavir)  Phenothiazine medicine  Triptan medicine to treat migraine headaches  Do not drink alcohol while you are using this medicine. Tell your doctor if you use anything else that makes you sleepy. Some examples are allergy medicine, narcotic pain medicine, and alcohol. Tell your doctor if you are also using butorphanol, nalbuphine, pentazocine, or a muscle relaxer. Warnings While Using This Medicine:   Tell your doctor if you are pregnant or breastfeeding, or if you have kidney disease, liver disease (including hepatitis), lung or breathing problems (including sleep apnea), tooth problems (including history of cavities), adrenal gland problems, an enlarged prostate, trouble urinating, gallbladder problems, thyroid problems, stomach problems, or a history of depression. Tell your doctor if you have heart disease, congestive heart failure, a slow heartbeat, or a history of heart rhythm problems (including long QT syndrome). Tell your doctor if you have a brain tumor, head injury, or alcohol or drug abuse.   This medicine may cause the following problems:  High risk of overdose, which can lead to death  Respiratory depression (serious breathing problem that can be life-threatening)  Adrenal gland problems  Sleep-related breathing problems (including sleep apnea, sleep-related hypoxemia)  Low blood pressure  Liver problems  QT prolongation (heart rhythm problem)  Tooth problems (including tooth fracture, tooth loss)  Serotonin syndrome, when used with certain medicines  This medicine may make you dizzy or drowsy. Do not drive or do anything else that could be dangerous until you know how this medicine affects you. Sit or lie down if you feel dizzy. Stand up carefully. Tell any doctor or dentist who treats you that you are using this medicine. This medicine can be habit-forming. Do not use more than your prescribed dose. Call your doctor if you think your medicine is not working. This medicine may cause constipation, especially with long-term use. Ask your doctor if you should use a laxative to prevent and treat constipation. Do not stop using this medicine suddenly. Your doctor will need to slowly decrease your dose before you stop it completely. This medicine could cause infertility. Talk with your doctor before using this medicine if you plan to have children. Your doctor will do lab tests at regular visits to check on the effects of this medicine. Keep all appointments. Keep all medicine out of the reach of children. Never share your medicine with anyone. Possible Side Effects While Using This Medicine:   Call your doctor right away if you notice any of these side effects:   Allergic reaction: Itching or hives, swelling in your face or hands, swelling or tingling in your mouth or throat, chest tightness, trouble breathing  Anxiety, restlessness, fast heartbeat, fever, muscle spasms, twitching, diarrhea, seeing or hearing things that are not there  Blue lips, fingernails, or skin, trouble breathing  Changes in skin color, dark freckles, cold feeling, tiredness, weight loss  Dark urine or pale stools, nausea, vomiting, loss of appetite, stomach pain, yellow skin or eyes  Extreme dizziness, drowsiness, or weakness, slow heartbeat, sweating, seizures, cold or clammy skin  Fast, pounding, or uneven heartbeat  Severe confusion, lightheadedness, dizziness, or fainting  Trouble breathing or slow breathing  Toothache  If you notice these less serious side effects, talk with your doctor:   Constipation, diarrhea, nausea, vomiting, stomach upset  Headache  Stuffy or runny nose  If you notice other side effects that you think are caused by this medicine, tell your doctor. Call your doctor for medical advice about side effects. You may report side effects to FDA at 2-794-FDA-4053  © Copyright Drea Kumar 2023 Information is for End User's use only and may not be sold, redistributed or otherwise used for commercial purposes. The above information is an  only. It is not intended as medical advice for individual conditions or treatments. Talk to your doctor, nurse or pharmacist before following any medical regimen to see if it is safe and effective for you.
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
## 2023-12-14 LAB

## 2023-12-23 ENCOUNTER — HOSPITAL ENCOUNTER (EMERGENCY)
Facility: HOSPITAL | Age: 43
Discharge: HOME | End: 2023-12-23
Attending: EMERGENCY MEDICINE | Admitting: EMERGENCY MEDICINE
Payer: COMMERCIAL

## 2023-12-23 ENCOUNTER — APPOINTMENT (EMERGENCY)
Dept: RADIOLOGY | Facility: HOSPITAL | Age: 43
End: 2023-12-23
Payer: COMMERCIAL

## 2023-12-23 VITALS
RESPIRATION RATE: 16 BRPM | TEMPERATURE: 98 F | SYSTOLIC BLOOD PRESSURE: 134 MMHG | HEART RATE: 86 BPM | OXYGEN SATURATION: 96 % | DIASTOLIC BLOOD PRESSURE: 68 MMHG

## 2023-12-23 DIAGNOSIS — R60.0 BILATERAL LOWER EXTREMITY EDEMA: Primary | ICD-10-CM

## 2023-12-23 LAB
ANION GAP SERPL CALCULATED.3IONS-SCNC: 7 MMOL/L
BASOPHILS # BLD AUTO: 0.08 THOUSANDS/ÂΜL (ref 0–0.1)
BASOPHILS NFR BLD AUTO: 1 % (ref 0–1)
BNP SERPL-MCNC: 24 PG/ML (ref 0–100)
BUN SERPL-MCNC: 9 MG/DL (ref 5–25)
CALCIUM SERPL-MCNC: 8.9 MG/DL (ref 8.4–10.2)
CHLORIDE SERPL-SCNC: 107 MMOL/L (ref 96–108)
CO2 SERPL-SCNC: 24 MMOL/L (ref 21–32)
CREAT SERPL-MCNC: 0.73 MG/DL (ref 0.6–1.3)
D DIMER PPP FEU-MCNC: 2.03 UG/ML FEU
EOSINOPHIL # BLD AUTO: 0.56 THOUSAND/ÂΜL (ref 0–0.61)
EOSINOPHIL NFR BLD AUTO: 9 % (ref 0–6)
ERYTHROCYTE [DISTWIDTH] IN BLOOD BY AUTOMATED COUNT: 13.2 % (ref 11.6–15.1)
GFR SERPL CREATININE-BSD FRML MDRD: 99 ML/MIN/1.73SQ M
GLUCOSE SERPL-MCNC: 98 MG/DL (ref 65–140)
HCT VFR BLD AUTO: 35.8 % (ref 34.8–46.1)
HGB BLD-MCNC: 11.6 G/DL (ref 11.5–15.4)
IMM GRANULOCYTES # BLD AUTO: 0.02 THOUSAND/UL (ref 0–0.2)
IMM GRANULOCYTES NFR BLD AUTO: 0 % (ref 0–2)
LYMPHOCYTES # BLD AUTO: 2.35 THOUSANDS/ÂΜL (ref 0.6–4.47)
LYMPHOCYTES NFR BLD AUTO: 36 % (ref 14–44)
MCH RBC QN AUTO: 30.1 PG (ref 26.8–34.3)
MCHC RBC AUTO-ENTMCNC: 32.4 G/DL (ref 31.4–37.4)
MCV RBC AUTO: 93 FL (ref 82–98)
MONOCYTES # BLD AUTO: 0.48 THOUSAND/ÂΜL (ref 0.17–1.22)
MONOCYTES NFR BLD AUTO: 7 % (ref 4–12)
NEUTROPHILS # BLD AUTO: 2.97 THOUSANDS/ÂΜL (ref 1.85–7.62)
NEUTS SEG NFR BLD AUTO: 47 % (ref 43–75)
NRBC BLD AUTO-RTO: 0 /100 WBCS
PLATELET # BLD AUTO: 359 THOUSANDS/UL (ref 149–390)
PMV BLD AUTO: 8.8 FL (ref 8.9–12.7)
POTASSIUM SERPL-SCNC: 3.9 MMOL/L (ref 3.5–5.3)
RBC # BLD AUTO: 3.85 MILLION/UL (ref 3.81–5.12)
SODIUM SERPL-SCNC: 138 MMOL/L (ref 135–147)
WBC # BLD AUTO: 6.46 THOUSAND/UL (ref 4.31–10.16)

## 2023-12-23 PROCEDURE — 80048 BASIC METABOLIC PNL TOTAL CA: CPT | Performed by: EMERGENCY MEDICINE

## 2023-12-23 PROCEDURE — 83880 ASSAY OF NATRIURETIC PEPTIDE: CPT | Performed by: EMERGENCY MEDICINE

## 2023-12-23 PROCEDURE — 85025 COMPLETE CBC W/AUTO DIFF WBC: CPT | Performed by: EMERGENCY MEDICINE

## 2023-12-23 PROCEDURE — 99285 EMERGENCY DEPT VISIT HI MDM: CPT | Performed by: EMERGENCY MEDICINE

## 2023-12-23 PROCEDURE — 85379 FIBRIN DEGRADATION QUANT: CPT | Performed by: EMERGENCY MEDICINE

## 2023-12-23 PROCEDURE — 99284 EMERGENCY DEPT VISIT MOD MDM: CPT

## 2023-12-23 PROCEDURE — 36415 COLL VENOUS BLD VENIPUNCTURE: CPT | Performed by: EMERGENCY MEDICINE

## 2023-12-23 PROCEDURE — 71045 X-RAY EXAM CHEST 1 VIEW: CPT

## 2023-12-23 RX ORDER — TORSEMIDE 10 MG/1
10 TABLET ORAL DAILY
Qty: 3 TABLET | Refills: 0 | Status: SHIPPED | OUTPATIENT
Start: 2023-12-23 | End: 2023-12-26

## 2023-12-24 NOTE — DISCHARGE INSTRUCTIONS
Use compression stockings to help with your lower leg swelling.  Hydrate well with water and take the water pill (torsemide) for the next 3 days.  If you experience any dry mouth, persistent thirst, or lightheadedness particularly when standing up, stop taking the water pill.  You will be called for the ultrasound of your legs to make sure there is no blood clot that is causing worsening swelling in your legs.    Please call the [facility] clinic to establish care with a primary care physician.  Seek medical attention if your symptoms are worsening, especially if you are developing chest pain, or difficulty breathing.
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
## 2024-06-20 ENCOUNTER — VBI (OUTPATIENT)
Dept: ADMINISTRATIVE | Facility: OTHER | Age: 44
End: 2024-06-20

## 2024-06-20 NOTE — TELEPHONE ENCOUNTER
06/20/24 11:11 AM     Chart reviewed for Pap Smear (HPV) aka Cervical Cancer Screening ; nothing is submitted to the patient's insurance at this time.     Mariah Billy   [facility]